# Patient Record
Sex: FEMALE | Race: WHITE | NOT HISPANIC OR LATINO
[De-identification: names, ages, dates, MRNs, and addresses within clinical notes are randomized per-mention and may not be internally consistent; named-entity substitution may affect disease eponyms.]

---

## 2017-05-19 ENCOUNTER — APPOINTMENT (OUTPATIENT)
Dept: NEUROLOGY | Facility: CLINIC | Age: 82
End: 2017-05-19

## 2017-06-08 ENCOUNTER — APPOINTMENT (OUTPATIENT)
Dept: INTERNAL MEDICINE | Facility: CLINIC | Age: 82
End: 2017-06-08

## 2017-06-15 ENCOUNTER — OTHER (OUTPATIENT)
Age: 82
End: 2017-06-15

## 2017-06-15 LAB
25(OH)D3 SERPL-MCNC: 37.4
CHOLEST SERPL-MCNC: 142
CHOLEST/HDLC SERPL: 2.1
GLUCOSE SERPL-MCNC: 97
HBA1C MFR BLD HPLC: 5.8
HDLC SERPL-MCNC: 69
LDLC SERPL CALC-MCNC: 55
TRIGL SERPL-MCNC: 91
VLDL CHOLESTEROL CAL: 18

## 2017-06-19 ENCOUNTER — NON-APPOINTMENT (OUTPATIENT)
Age: 82
End: 2017-06-19

## 2017-06-19 ENCOUNTER — APPOINTMENT (OUTPATIENT)
Dept: INTERNAL MEDICINE | Facility: CLINIC | Age: 82
End: 2017-06-19

## 2017-06-19 VITALS
DIASTOLIC BLOOD PRESSURE: 84 MMHG | SYSTOLIC BLOOD PRESSURE: 132 MMHG | WEIGHT: 167 LBS | TEMPERATURE: 98.1 F | RESPIRATION RATE: 16 BRPM | HEIGHT: 65 IN | OXYGEN SATURATION: 96 % | HEART RATE: 92 BPM | BODY MASS INDEX: 27.82 KG/M2

## 2017-06-19 DIAGNOSIS — M54.30 SCIATICA, UNSPECIFIED SIDE: ICD-10-CM

## 2017-06-20 ENCOUNTER — RX RENEWAL (OUTPATIENT)
Age: 82
End: 2017-06-20

## 2017-06-27 ENCOUNTER — APPOINTMENT (OUTPATIENT)
Dept: DERMATOLOGY | Facility: CLINIC | Age: 82
End: 2017-06-27

## 2017-08-29 ENCOUNTER — APPOINTMENT (OUTPATIENT)
Dept: DERMATOLOGY | Facility: CLINIC | Age: 82
End: 2017-08-29
Payer: MEDICARE

## 2017-08-29 DIAGNOSIS — Z86.69 PERSONAL HISTORY OF OTHER DISEASES OF THE NERVOUS SYSTEM AND SENSE ORGANS: ICD-10-CM

## 2017-08-29 PROCEDURE — 99203 OFFICE O/P NEW LOW 30 MIN: CPT | Mod: 25

## 2017-08-29 PROCEDURE — 17000 DESTRUCT PREMALG LESION: CPT

## 2017-08-29 PROCEDURE — 17003 DESTRUCT PREMALG LES 2-14: CPT

## 2017-09-28 ENCOUNTER — APPOINTMENT (OUTPATIENT)
Dept: INTERNAL MEDICINE | Facility: CLINIC | Age: 82
End: 2017-09-28
Payer: MEDICARE

## 2017-09-28 VITALS
DIASTOLIC BLOOD PRESSURE: 70 MMHG | OXYGEN SATURATION: 93 % | WEIGHT: 159 LBS | TEMPERATURE: 99.9 F | RESPIRATION RATE: 18 BRPM | HEIGHT: 65 IN | HEART RATE: 112 BPM | SYSTOLIC BLOOD PRESSURE: 124 MMHG | BODY MASS INDEX: 26.49 KG/M2

## 2017-09-28 PROCEDURE — 99213 OFFICE O/P EST LOW 20 MIN: CPT

## 2017-09-28 RX ORDER — OXYCODONE 5 MG/1
5 TABLET ORAL
Qty: 40 | Refills: 0 | Status: DISCONTINUED | COMMUNITY
Start: 2017-06-19 | End: 2017-09-28

## 2017-10-02 ENCOUNTER — CLINICAL ADVICE (OUTPATIENT)
Age: 82
End: 2017-10-02

## 2017-10-02 RX ORDER — CEFUROXIME AXETIL 500 MG/1
500 TABLET ORAL
Qty: 20 | Refills: 0 | Status: COMPLETED | COMMUNITY
Start: 2017-09-28 | End: 2017-10-02

## 2017-10-16 ENCOUNTER — APPOINTMENT (OUTPATIENT)
Dept: INTERNAL MEDICINE | Facility: CLINIC | Age: 82
End: 2017-10-16
Payer: MEDICARE

## 2017-10-16 VITALS
OXYGEN SATURATION: 95 % | SYSTOLIC BLOOD PRESSURE: 120 MMHG | DIASTOLIC BLOOD PRESSURE: 80 MMHG | HEART RATE: 90 BPM | BODY MASS INDEX: 27.82 KG/M2 | HEIGHT: 65 IN | WEIGHT: 167 LBS | RESPIRATION RATE: 16 BRPM | TEMPERATURE: 98.1 F

## 2017-10-16 PROCEDURE — 99214 OFFICE O/P EST MOD 30 MIN: CPT

## 2017-12-19 ENCOUNTER — APPOINTMENT (OUTPATIENT)
Dept: DERMATOLOGY | Facility: CLINIC | Age: 82
End: 2017-12-19

## 2017-12-22 ENCOUNTER — OTHER (OUTPATIENT)
Age: 82
End: 2017-12-22

## 2018-01-05 ENCOUNTER — RX RENEWAL (OUTPATIENT)
Age: 83
End: 2018-01-05

## 2018-01-09 ENCOUNTER — APPOINTMENT (OUTPATIENT)
Dept: DERMATOLOGY | Facility: CLINIC | Age: 83
End: 2018-01-09

## 2018-01-11 ENCOUNTER — APPOINTMENT (OUTPATIENT)
Dept: INTERNAL MEDICINE | Facility: CLINIC | Age: 83
End: 2018-01-11
Payer: MEDICARE

## 2018-01-11 VITALS
OXYGEN SATURATION: 96 % | WEIGHT: 167 LBS | TEMPERATURE: 98.1 F | RESPIRATION RATE: 18 BRPM | HEART RATE: 80 BPM | DIASTOLIC BLOOD PRESSURE: 60 MMHG | SYSTOLIC BLOOD PRESSURE: 114 MMHG | BODY MASS INDEX: 27.82 KG/M2 | HEIGHT: 65 IN

## 2018-01-11 DIAGNOSIS — M54.5 LOW BACK PAIN: ICD-10-CM

## 2018-01-11 DIAGNOSIS — G89.29 LOW BACK PAIN: ICD-10-CM

## 2018-01-11 PROCEDURE — 99215 OFFICE O/P EST HI 40 MIN: CPT

## 2018-01-22 ENCOUNTER — APPOINTMENT (OUTPATIENT)
Dept: DERMATOLOGY | Facility: CLINIC | Age: 83
End: 2018-01-22
Payer: MEDICARE

## 2018-01-22 DIAGNOSIS — L81.4 OTHER MELANIN HYPERPIGMENTATION: ICD-10-CM

## 2018-01-22 PROCEDURE — 17003 DESTRUCT PREMALG LES 2-14: CPT

## 2018-01-22 PROCEDURE — 17000 DESTRUCT PREMALG LESION: CPT

## 2018-01-22 PROCEDURE — 99213 OFFICE O/P EST LOW 20 MIN: CPT | Mod: 25

## 2018-02-20 ENCOUNTER — RX RENEWAL (OUTPATIENT)
Age: 83
End: 2018-02-20

## 2018-02-23 ENCOUNTER — RX RENEWAL (OUTPATIENT)
Age: 83
End: 2018-02-23

## 2018-02-26 ENCOUNTER — RX RENEWAL (OUTPATIENT)
Age: 83
End: 2018-02-26

## 2018-04-05 ENCOUNTER — RX RENEWAL (OUTPATIENT)
Age: 83
End: 2018-04-05

## 2018-06-18 ENCOUNTER — APPOINTMENT (OUTPATIENT)
Dept: DERMATOLOGY | Facility: CLINIC | Age: 83
End: 2018-06-18
Payer: MEDICARE

## 2018-06-18 DIAGNOSIS — L57.0 ACTINIC KERATOSIS: ICD-10-CM

## 2018-06-18 DIAGNOSIS — L82.0 INFLAMED SEBORRHEIC KERATOSIS: ICD-10-CM

## 2018-06-18 PROCEDURE — 17110 DESTRUCTION B9 LES UP TO 14: CPT

## 2018-06-18 PROCEDURE — 99213 OFFICE O/P EST LOW 20 MIN: CPT | Mod: 25

## 2018-06-18 RX ORDER — OXYCODONE AND ACETAMINOPHEN 5; 325 MG/1; MG/1
5-325 TABLET ORAL
Qty: 60 | Refills: 0 | Status: DISCONTINUED | COMMUNITY
Start: 2018-01-11 | End: 2018-06-18

## 2018-06-26 ENCOUNTER — APPOINTMENT (OUTPATIENT)
Dept: INTERNAL MEDICINE | Facility: CLINIC | Age: 83
End: 2018-06-26
Payer: MEDICARE

## 2018-06-26 ENCOUNTER — NON-APPOINTMENT (OUTPATIENT)
Age: 83
End: 2018-06-26

## 2018-06-26 VITALS
TEMPERATURE: 97.7 F | DIASTOLIC BLOOD PRESSURE: 76 MMHG | SYSTOLIC BLOOD PRESSURE: 130 MMHG | HEART RATE: 78 BPM | RESPIRATION RATE: 16 BRPM | OXYGEN SATURATION: 94 % | BODY MASS INDEX: 28.99 KG/M2 | WEIGHT: 174 LBS | HEIGHT: 65 IN
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
## 2018-06-26 PROCEDURE — 94060 EVALUATION OF WHEEZING: CPT

## 2018-06-26 PROCEDURE — 99214 OFFICE O/P EST MOD 30 MIN: CPT | Mod: 25

## 2018-06-26 NOTE — HISTORY OF PRESENT ILLNESS
[FreeTextEntry1] : Followup eval [de-identified] : Mrs. Maynard presents for followup evaluation. She denies any chest pain, shortness of breath palpitations. She has some shortness of breath with exertion. She continues on Breo 200/25 mcg one puff daily. She has no hematuria dysuria. Mrs. Maynard is feeling somewhat depressed. She states that she is feeling guilty about that her relationship with her  who passed away last year. She states she is having some feelings of guilt over his treatment.

## 2018-06-26 NOTE — DATA REVIEWED
[FreeTextEntry1] : Spirometry pre-and postbronchodilator therapy shows moderate obstructive lung disease. FEV1 is 1.03 L which is 57% predicted. FEV1/FVC ratio 68%. There is no significant bronchodilator response.

## 2018-06-26 NOTE — ASSESSMENT
[FreeTextEntry1] : Mr. Maynard will continue with her medication regimen. Previous lab work has been reviewed. I recommended that she consider seeing a therapist for her current feelings of chills and depression. She does not wish to start on any new medication. She will continue on duloxetine. Followup in 6 months.

## 2018-08-06 ENCOUNTER — RX RENEWAL (OUTPATIENT)
Age: 83
End: 2018-08-06

## 2018-08-07 ENCOUNTER — RX RENEWAL (OUTPATIENT)
Age: 83
End: 2018-08-07

## 2018-08-07 ENCOUNTER — RX CHANGE (OUTPATIENT)
Age: 83
End: 2018-08-07

## 2018-12-03 ENCOUNTER — MEDICATION RENEWAL (OUTPATIENT)
Age: 83
End: 2018-12-03

## 2018-12-20 ENCOUNTER — APPOINTMENT (OUTPATIENT)
Dept: INTERNAL MEDICINE | Facility: CLINIC | Age: 83
End: 2018-12-20
Payer: MEDICARE

## 2018-12-20 VITALS
OXYGEN SATURATION: 96 % | HEIGHT: 65 IN | SYSTOLIC BLOOD PRESSURE: 120 MMHG | WEIGHT: 170 LBS | BODY MASS INDEX: 28.32 KG/M2 | TEMPERATURE: 98.4 F | HEART RATE: 74 BPM | DIASTOLIC BLOOD PRESSURE: 70 MMHG | RESPIRATION RATE: 16 BRPM

## 2018-12-20 DIAGNOSIS — G62.9 POLYNEUROPATHY, UNSPECIFIED: ICD-10-CM

## 2018-12-20 DIAGNOSIS — M16.10 UNILATERAL PRIMARY OSTEOARTHRITIS, UNSPECIFIED HIP: ICD-10-CM

## 2018-12-20 PROCEDURE — 99214 OFFICE O/P EST MOD 30 MIN: CPT

## 2018-12-20 RX ORDER — VALSARTAN AND HYDROCHLOROTHIAZIDE 160; 12.5 MG/1; MG/1
160-12.5 TABLET, FILM COATED ORAL
Qty: 90 | Refills: 1 | Status: DISCONTINUED | COMMUNITY
Start: 2017-01-25 | End: 2018-12-20

## 2018-12-20 NOTE — PLAN
[FreeTextEntry1] : Mrs. Maynard presents for followup evaluation. She will continue on current medication regimen. Valsartan hydrochlorothiazide was discontinued and the patient is currently on losartan hydrochlorothiazide. Wellbutrin  mg daily will be added to the patient's Cymbalta. Ms. Maynard has been given a prescription for blood profile prior to her next visit in 4 months. She has received the flu vaccine.

## 2018-12-20 NOTE — HISTORY OF PRESENT ILLNESS
[de-identified] : Ms. Maynard presents for followup evaluation. She continues to have significant joint pain. Patient denies any chest pain or palpitations. Ms. Maynard states she is feeling somewhat depressed. She has no suicidal ideation. She is taking Cymbalta, however, she is taking it for treatment of her neuropathy.

## 2019-01-11 ENCOUNTER — APPOINTMENT (OUTPATIENT)
Dept: DERMATOLOGY | Facility: CLINIC | Age: 84
End: 2019-01-11

## 2019-01-14 ENCOUNTER — RX RENEWAL (OUTPATIENT)
Age: 84
End: 2019-01-14

## 2019-02-14 ENCOUNTER — RX RENEWAL (OUTPATIENT)
Age: 84
End: 2019-02-14

## 2019-02-19 ENCOUNTER — MEDICATION RENEWAL (OUTPATIENT)
Age: 84
End: 2019-02-19

## 2019-02-21 ENCOUNTER — RX RENEWAL (OUTPATIENT)
Age: 84
End: 2019-02-21

## 2019-03-25 ENCOUNTER — APPOINTMENT (OUTPATIENT)
Dept: INTERNAL MEDICINE | Facility: CLINIC | Age: 84
End: 2019-03-25
Payer: MEDICARE

## 2019-03-25 ENCOUNTER — OTHER (OUTPATIENT)
Age: 84
End: 2019-03-25

## 2019-03-25 ENCOUNTER — NON-APPOINTMENT (OUTPATIENT)
Age: 84
End: 2019-03-25

## 2019-03-25 VITALS
TEMPERATURE: 98.1 F | SYSTOLIC BLOOD PRESSURE: 142 MMHG | OXYGEN SATURATION: 94 % | HEART RATE: 82 BPM | WEIGHT: 167 LBS | BODY MASS INDEX: 28.51 KG/M2 | DIASTOLIC BLOOD PRESSURE: 92 MMHG | RESPIRATION RATE: 24 BRPM | HEIGHT: 64 IN

## 2019-03-25 DIAGNOSIS — R06.02 SHORTNESS OF BREATH: ICD-10-CM

## 2019-03-25 PROCEDURE — 94060 EVALUATION OF WHEEZING: CPT

## 2019-03-25 PROCEDURE — 96372 THER/PROPH/DIAG INJ SC/IM: CPT | Mod: 59

## 2019-03-25 PROCEDURE — 99214 OFFICE O/P EST MOD 30 MIN: CPT | Mod: 25

## 2019-03-25 RX ORDER — TRIAMCINOLONE ACETONIDE 40 MG/ML
40 SUSPENSION INTRA-ARTERIAL; INTRAMUSCULAR
Qty: 6 | Refills: 0 | Status: COMPLETED | OUTPATIENT
Start: 2019-03-25

## 2019-03-25 RX ORDER — BUPROPION HYDROCHLORIDE 150 MG/1
150 TABLET, EXTENDED RELEASE ORAL DAILY
Qty: 30 | Refills: 5 | Status: DISCONTINUED | COMMUNITY
Start: 2018-12-20 | End: 2019-03-25

## 2019-03-25 RX ADMIN — TRIAMCINOLONE ACETONIDE 0 MG/ML: 40 INJECTION, SUSPENSION INTRA-ARTICULAR; INTRAMUSCULAR at 00:00

## 2019-03-25 NOTE — HISTORY OF PRESENT ILLNESS
[Family Member] : family member [FreeTextEntry1] : Follow up from Urgent Care visit. [de-identified] : Patient presented to Urgent Care yesterday for complaint of SOB, wheeze and cough.  Cough is frequent, "her sides hurt."  Now taking Prednisone 20 MG BID and Doxycycline 100 MG BID.  Started these medications last night.  X ray yesterday negative for infiltrates.\par Cough and SOB today.\zeinab Was treated in [place] for similar complaints, given Z-pack and Medrol dose pack, never completely resolved but was too busy arranging to come back home for the summer to follow up there.\par Patient reports that in general for 1 year she is increasingly short of breath on exertion, walking from car to building, any stair flights.  Tries to remain active but this is difficult because of gait instability and lower extremity neuropathy.  She reports compliance with Breo daily.\zeinab Follows closely with Cardiologist, has history of MI and multiple stent placement.  No history of CHF or Atrial fibrillation.

## 2019-03-25 NOTE — REVIEW OF SYSTEMS
[Fatigue] : fatigue [Shortness Of Breath] : shortness of breath [Wheezing] : wheezing [Cough] : cough [Dyspnea on Exertion] : dyspnea on exertion [Unsteady Walking] : ataxia [Negative] : Heme/Lymph [Fever] : no fever [Chills] : no chills [Night Sweats] : no night sweats [Dizziness] : no dizziness [Fainting] : no fainting [Confusion] : no confusion [FreeTextEntry9] : see HPI

## 2019-03-25 NOTE — DATA REVIEWED
[FreeTextEntry1] : Spirometry shows severe airway obstruction with no improvement post bronchodilator.

## 2019-03-25 NOTE — PHYSICAL EXAM
[No Acute Distress] : no acute distress [Normal Sclera/Conjunctiva] : normal sclera/conjunctiva [PERRL] : pupils equal round and reactive to light [Normal TMs] : both tympanic membranes were normal [Supple] : supple [Normal Rate] : normal rate  [Regular Rhythm] : with a regular rhythm [Normal Supraclavicular Nodes] : no supraclavicular lymphadenopathy [Normal Posterior Cervical Nodes] : no posterior cervical lymphadenopathy [Normal Anterior Cervical Nodes] : no anterior cervical lymphadenopathy [Grossly Normal Strength/Tone] : grossly normal strength/tone [No Focal Deficits] : no focal deficits [Normal Affect] : the affect was normal [Normal Insight/Judgement] : insight and judgment were intact [de-identified] : Oropharynx mildly erythematous without exudates [de-identified] : Breath sounds distant.  End expiratory wheeze throughout

## 2019-03-25 NOTE — PLAN
[FreeTextEntry1] : Continue current medications as prescribed.\par Kenelog 60 MG IM today.\par Return to office in 2 weeks to reassess, pre post.\par Will consider changing her Breo to Budesonide and Brovana through nebulizer delivery for better control.\par Consider CT of chest.\par Call office sooner if no steady improvement in current symptoms.

## 2019-04-09 ENCOUNTER — RESULT REVIEW (OUTPATIENT)
Age: 84
End: 2019-04-09

## 2019-04-09 ENCOUNTER — APPOINTMENT (OUTPATIENT)
Dept: INTERNAL MEDICINE | Facility: CLINIC | Age: 84
End: 2019-04-09
Payer: MEDICARE

## 2019-04-09 ENCOUNTER — NON-APPOINTMENT (OUTPATIENT)
Age: 84
End: 2019-04-09

## 2019-04-09 VITALS
RESPIRATION RATE: 18 BRPM | HEIGHT: 64 IN | OXYGEN SATURATION: 96 % | DIASTOLIC BLOOD PRESSURE: 76 MMHG | SYSTOLIC BLOOD PRESSURE: 108 MMHG | HEART RATE: 74 BPM | BODY MASS INDEX: 28.17 KG/M2 | WEIGHT: 164.99 LBS | TEMPERATURE: 97.9 F

## 2019-04-09 PROCEDURE — 94060 EVALUATION OF WHEEZING: CPT

## 2019-04-09 PROCEDURE — 99214 OFFICE O/P EST MOD 30 MIN: CPT | Mod: 25

## 2019-04-09 NOTE — REVIEW OF SYSTEMS
[Dyspnea on Exertion] : dyspnea on exertion [Joint Pain] : joint pain [Muscle Pain] : muscle pain [Back Pain] : back pain [Negative] : Heme/Lymph [Discharge] : no discharge [Pain] : no pain [Vision Problems] : no vision problems [Itching] : no itching [Wheezing] : no wheezing [Shortness Of Breath] : no shortness of breath [Headache] : no headache [Memory Loss] : no memory loss [Cough] : no cough [Unsteady Walking] : no ataxia [de-identified] : gait instability secondary to neuropathy.

## 2019-04-09 NOTE — PHYSICAL EXAM
[No Acute Distress] : no acute distress [Normal Oropharynx] : the oropharynx was normal [Normal Sclera/Conjunctiva] : normal sclera/conjunctiva [Clear to Auscultation] : lungs were clear to auscultation bilaterally [Supple] : supple [No Respiratory Distress] : no respiratory distress  [Regular Rhythm] : with a regular rhythm [Normal Rate] : normal rate  [Normal Posterior Cervical Nodes] : no posterior cervical lymphadenopathy [No Edema] : there was no peripheral edema [Grossly Normal Strength/Tone] : grossly normal strength/tone [No Spinal Tenderness] : no spinal tenderness [Normal Anterior Cervical Nodes] : no anterior cervical lymphadenopathy [No Focal Deficits] : no focal deficits [Normal Affect] : the affect was normal

## 2019-04-09 NOTE — PLAN
[FreeTextEntry1] : Continue all current medications as prescribed.\par Patient is advised to monitor blood pressure at home and keep log of readings.\par If consistently below 110 systolic and below 70 diastolic notify.\par Letter written verifying back pain and gait instability secondary to neuropathy.\par Appointment with Dr. Duncan  6/25/2019.\par Call office sooner for questions or problems.

## 2019-04-09 NOTE — HISTORY OF PRESENT ILLNESS
[FreeTextEntry8] : Patient returns to our office for re evaluation of lung status.  She was seen in our office 2 weeks ago for acute asthmatic bronchitis and completed Doxycycline and prednisone regimen.  Given Kenelog IM at time of visit.  From a pulmonary standpoint feeling well, denies SOB, cough or sputum production.  Compliant with pulmonary medications.\par She voices concern today that her blood pressure in our office is 108/76.  Takes Losartan HCTZ 50-12.5 MG daily.  Denies dizziness, palpitations or chest pains.  Does not take her blood pressure at home but does have the equipment to do so.  B/P last visit was 142/92 and prior to that in good range.\zeinab Has chronic back pain and bilateral lower extremity neuropathy causing gait instability.  Ambulates with a cane and has a stair lift at home. She is asking for a letter stating her inability to climb stairs to submit to the company that is going to install a stair lift in [place].\par

## 2019-04-29 ENCOUNTER — MEDICATION RENEWAL (OUTPATIENT)
Age: 84
End: 2019-04-29

## 2019-04-29 ENCOUNTER — APPOINTMENT (OUTPATIENT)
Dept: INTERNAL MEDICINE | Facility: CLINIC | Age: 84
End: 2019-04-29

## 2019-04-29 ENCOUNTER — RX RENEWAL (OUTPATIENT)
Age: 84
End: 2019-04-29

## 2019-05-06 ENCOUNTER — INPATIENT (INPATIENT)
Facility: HOSPITAL | Age: 84
LOS: 2 days | Discharge: HOME | End: 2019-05-09
Attending: FAMILY MEDICINE | Admitting: FAMILY MEDICINE
Payer: MEDICARE

## 2019-05-06 VITALS
HEIGHT: 65 IN | TEMPERATURE: 100 F | RESPIRATION RATE: 20 BRPM | HEART RATE: 121 BPM | SYSTOLIC BLOOD PRESSURE: 176 MMHG | OXYGEN SATURATION: 91 % | DIASTOLIC BLOOD PRESSURE: 113 MMHG | WEIGHT: 164.91 LBS

## 2019-05-06 LAB
ADD ON TEST-SPECIMEN IN LAB: SIGNIFICANT CHANGE UP
ALBUMIN SERPL ELPH-MCNC: 3.6 G/DL — SIGNIFICANT CHANGE UP (ref 3.3–5)
ALP SERPL-CCNC: 78 U/L — SIGNIFICANT CHANGE UP (ref 40–120)
ALT FLD-CCNC: 34 U/L — SIGNIFICANT CHANGE UP (ref 12–78)
ANION GAP SERPL CALC-SCNC: 9 MMOL/L — SIGNIFICANT CHANGE UP (ref 5–17)
APPEARANCE UR: CLEAR — SIGNIFICANT CHANGE UP
APTT BLD: 33.6 SEC — SIGNIFICANT CHANGE UP (ref 27.5–36.3)
AST SERPL-CCNC: 31 U/L — SIGNIFICANT CHANGE UP (ref 15–37)
BASOPHILS # BLD AUTO: 0.05 K/UL — SIGNIFICANT CHANGE UP (ref 0–0.2)
BASOPHILS NFR BLD AUTO: 0.3 % — SIGNIFICANT CHANGE UP (ref 0–2)
BILIRUB SERPL-MCNC: 0.7 MG/DL — SIGNIFICANT CHANGE UP (ref 0.2–1.2)
BILIRUB UR-MCNC: NEGATIVE — SIGNIFICANT CHANGE UP
BUN SERPL-MCNC: 9 MG/DL — SIGNIFICANT CHANGE UP (ref 7–23)
CALCIUM SERPL-MCNC: 9 MG/DL — SIGNIFICANT CHANGE UP (ref 8.5–10.1)
CHLORIDE SERPL-SCNC: 92 MMOL/L — LOW (ref 96–108)
CHLORIDE UR-SCNC: 89 MMOL/L — SIGNIFICANT CHANGE UP
CO2 SERPL-SCNC: 28 MMOL/L — SIGNIFICANT CHANGE UP (ref 22–31)
COLOR SPEC: YELLOW — SIGNIFICANT CHANGE UP
CREAT SERPL-MCNC: 0.75 MG/DL — SIGNIFICANT CHANGE UP (ref 0.5–1.3)
DIFF PNL FLD: ABNORMAL
EOSINOPHIL # BLD AUTO: 0.01 K/UL — SIGNIFICANT CHANGE UP (ref 0–0.5)
EOSINOPHIL NFR BLD AUTO: 0.1 % — SIGNIFICANT CHANGE UP (ref 0–6)
EPI CELLS # UR: SIGNIFICANT CHANGE UP
GLUCOSE SERPL-MCNC: 115 MG/DL — HIGH (ref 70–99)
GLUCOSE UR QL: NEGATIVE MG/DL — SIGNIFICANT CHANGE UP
HCT VFR BLD CALC: 46 % — HIGH (ref 34.5–45)
HGB BLD-MCNC: 15.6 G/DL — HIGH (ref 11.5–15.5)
HPIV3 RNA SPEC QL NAA+PROBE: DETECTED
IMM GRANULOCYTES NFR BLD AUTO: 0.5 % — SIGNIFICANT CHANGE UP (ref 0–1.5)
INR BLD: 1.09 RATIO — SIGNIFICANT CHANGE UP (ref 0.88–1.16)
KETONES UR-MCNC: ABNORMAL
LACTATE SERPL-SCNC: 1 MMOL/L — SIGNIFICANT CHANGE UP (ref 0.7–2)
LEUKOCYTE ESTERASE UR-ACNC: NEGATIVE — SIGNIFICANT CHANGE UP
LYMPHOCYTES # BLD AUTO: 0.71 K/UL — LOW (ref 1–3.3)
LYMPHOCYTES # BLD AUTO: 4.8 % — LOW (ref 13–44)
MCHC RBC-ENTMCNC: 30.5 PG — SIGNIFICANT CHANGE UP (ref 27–34)
MCHC RBC-ENTMCNC: 33.9 GM/DL — SIGNIFICANT CHANGE UP (ref 32–36)
MCV RBC AUTO: 89.8 FL — SIGNIFICANT CHANGE UP (ref 80–100)
MONOCYTES # BLD AUTO: 0.89 K/UL — SIGNIFICANT CHANGE UP (ref 0–0.9)
MONOCYTES NFR BLD AUTO: 6 % — SIGNIFICANT CHANGE UP (ref 2–14)
NEUTROPHILS # BLD AUTO: 13.19 K/UL — HIGH (ref 1.8–7.4)
NEUTROPHILS NFR BLD AUTO: 88.3 % — HIGH (ref 43–77)
NITRITE UR-MCNC: NEGATIVE — SIGNIFICANT CHANGE UP
NRBC # BLD: 0 /100 WBCS — SIGNIFICANT CHANGE UP (ref 0–0)
NT-PROBNP SERPL-SCNC: 980 PG/ML — HIGH (ref 0–450)
PH UR: 8 — SIGNIFICANT CHANGE UP (ref 5–8)
PLATELET # BLD AUTO: 202 K/UL — SIGNIFICANT CHANGE UP (ref 150–400)
POTASSIUM SERPL-MCNC: 3.6 MMOL/L — SIGNIFICANT CHANGE UP (ref 3.5–5.3)
POTASSIUM SERPL-SCNC: 3.6 MMOL/L — SIGNIFICANT CHANGE UP (ref 3.5–5.3)
PROT SERPL-MCNC: 7.9 GM/DL — SIGNIFICANT CHANGE UP (ref 6–8.3)
PROT UR-MCNC: 30 MG/DL
PROTHROM AB SERPL-ACNC: 12.1 SEC — SIGNIFICANT CHANGE UP (ref 10–12.9)
RAPID RVP RESULT: DETECTED
RBC # BLD: 5.12 M/UL — SIGNIFICANT CHANGE UP (ref 3.8–5.2)
RBC # FLD: 13.3 % — SIGNIFICANT CHANGE UP (ref 10.3–14.5)
RBC CASTS # UR COMP ASSIST: ABNORMAL /HPF (ref 0–4)
RV+EV RNA SPEC QL NAA+PROBE: DETECTED
SODIUM SERPL-SCNC: 129 MMOL/L — LOW (ref 135–145)
SODIUM SERPL-SCNC: 130 MMOL/L — LOW (ref 135–145)
SODIUM UR-SCNC: 109 MMOL/L — SIGNIFICANT CHANGE UP
SP GR SPEC: 1.01 — SIGNIFICANT CHANGE UP (ref 1.01–1.02)
TROPONIN I SERPL-MCNC: 0.03 NG/ML — SIGNIFICANT CHANGE UP (ref 0.01–0.04)
UROBILINOGEN FLD QL: NEGATIVE MG/DL — SIGNIFICANT CHANGE UP
WBC # BLD: 14.93 K/UL — HIGH (ref 3.8–10.5)
WBC # FLD AUTO: 14.93 K/UL — HIGH (ref 3.8–10.5)
WBC UR QL: NEGATIVE — SIGNIFICANT CHANGE UP

## 2019-05-06 PROCEDURE — 71045 X-RAY EXAM CHEST 1 VIEW: CPT | Mod: 26

## 2019-05-06 PROCEDURE — 93010 ELECTROCARDIOGRAM REPORT: CPT

## 2019-05-06 PROCEDURE — 99285 EMERGENCY DEPT VISIT HI MDM: CPT

## 2019-05-06 RX ORDER — CLOPIDOGREL BISULFATE 75 MG/1
75 TABLET, FILM COATED ORAL DAILY
Qty: 0 | Refills: 0 | Status: DISCONTINUED | OUTPATIENT
Start: 2019-05-06 | End: 2019-05-09

## 2019-05-06 RX ORDER — LOSARTAN POTASSIUM 100 MG/1
50 TABLET, FILM COATED ORAL DAILY
Qty: 0 | Refills: 0 | Status: DISCONTINUED | OUTPATIENT
Start: 2019-05-06 | End: 2019-05-09

## 2019-05-06 RX ORDER — GABAPENTIN 400 MG/1
1 CAPSULE ORAL
Qty: 0 | Refills: 0 | COMMUNITY

## 2019-05-06 RX ORDER — ASPIRIN/CALCIUM CARB/MAGNESIUM 324 MG
81 TABLET ORAL DAILY
Qty: 0 | Refills: 0 | Status: DISCONTINUED | OUTPATIENT
Start: 2019-05-06 | End: 2019-05-09

## 2019-05-06 RX ORDER — ISOSORBIDE MONONITRATE 60 MG/1
30 TABLET, EXTENDED RELEASE ORAL DAILY
Qty: 0 | Refills: 0 | Status: DISCONTINUED | OUTPATIENT
Start: 2019-05-06 | End: 2019-05-09

## 2019-05-06 RX ORDER — SODIUM CHLORIDE 9 MG/ML
1000 INJECTION INTRAMUSCULAR; INTRAVENOUS; SUBCUTANEOUS
Qty: 0 | Refills: 0 | Status: DISCONTINUED | OUTPATIENT
Start: 2019-05-06 | End: 2019-05-08

## 2019-05-06 RX ORDER — HEPARIN SODIUM 5000 [USP'U]/ML
5000 INJECTION INTRAVENOUS; SUBCUTANEOUS EVERY 12 HOURS
Qty: 0 | Refills: 0 | Status: DISCONTINUED | OUTPATIENT
Start: 2019-05-06 | End: 2019-05-09

## 2019-05-06 RX ORDER — SODIUM CHLORIDE 9 MG/ML
2300 INJECTION, SOLUTION INTRAVENOUS ONCE
Qty: 0 | Refills: 0 | Status: COMPLETED | OUTPATIENT
Start: 2019-05-06 | End: 2019-05-06

## 2019-05-06 RX ORDER — CEFTRIAXONE 500 MG/1
1000 INJECTION, POWDER, FOR SOLUTION INTRAMUSCULAR; INTRAVENOUS ONCE
Qty: 0 | Refills: 0 | Status: COMPLETED | OUTPATIENT
Start: 2019-05-06 | End: 2019-05-06

## 2019-05-06 RX ORDER — FLUTICASONE FUROATE AND VILANTEROL TRIFENATATE 100; 25 UG/1; UG/1
1 POWDER RESPIRATORY (INHALATION)
Qty: 0 | Refills: 0 | COMMUNITY

## 2019-05-06 RX ORDER — ACETAMINOPHEN 500 MG
325 TABLET ORAL EVERY 4 HOURS
Qty: 0 | Refills: 0 | Status: DISCONTINUED | OUTPATIENT
Start: 2019-05-06 | End: 2019-05-09

## 2019-05-06 RX ORDER — AZITHROMYCIN 500 MG/1
TABLET, FILM COATED ORAL
Qty: 0 | Refills: 0 | Status: DISCONTINUED | OUTPATIENT
Start: 2019-05-06 | End: 2019-05-08

## 2019-05-06 RX ORDER — ACETAMINOPHEN 500 MG
650 TABLET ORAL ONCE
Qty: 0 | Refills: 0 | Status: COMPLETED | OUTPATIENT
Start: 2019-05-06 | End: 2019-05-06

## 2019-05-06 RX ORDER — LOSARTAN/HYDROCHLOROTHIAZIDE 100MG-25MG
1 TABLET ORAL
Qty: 0 | Refills: 0 | COMMUNITY

## 2019-05-06 RX ORDER — CEFTRIAXONE 500 MG/1
1 INJECTION, POWDER, FOR SOLUTION INTRAMUSCULAR; INTRAVENOUS ONCE
Qty: 0 | Refills: 0 | Status: DISCONTINUED | OUTPATIENT
Start: 2019-05-06 | End: 2019-05-06

## 2019-05-06 RX ORDER — GABAPENTIN 400 MG/1
600 CAPSULE ORAL AT BEDTIME
Qty: 0 | Refills: 0 | Status: DISCONTINUED | OUTPATIENT
Start: 2019-05-06 | End: 2019-05-09

## 2019-05-06 RX ORDER — CEFTRIAXONE 500 MG/1
1000 INJECTION, POWDER, FOR SOLUTION INTRAMUSCULAR; INTRAVENOUS EVERY 24 HOURS
Qty: 0 | Refills: 0 | Status: DISCONTINUED | OUTPATIENT
Start: 2019-05-07 | End: 2019-05-09

## 2019-05-06 RX ORDER — DULOXETINE HYDROCHLORIDE 30 MG/1
60 CAPSULE, DELAYED RELEASE ORAL DAILY
Refills: 0 | Status: DISCONTINUED | OUTPATIENT
Start: 2019-05-06 | End: 2019-05-09

## 2019-05-06 RX ORDER — ONDANSETRON 8 MG/1
4 TABLET, FILM COATED ORAL EVERY 6 HOURS
Qty: 0 | Refills: 0 | Status: DISCONTINUED | OUTPATIENT
Start: 2019-05-06 | End: 2019-05-09

## 2019-05-06 RX ORDER — LANOLIN ALCOHOL/MO/W.PET/CERES
3 CREAM (GRAM) TOPICAL AT BEDTIME
Qty: 0 | Refills: 0 | Status: DISCONTINUED | OUTPATIENT
Start: 2019-05-06 | End: 2019-05-09

## 2019-05-06 RX ORDER — DOCUSATE SODIUM 100 MG
100 CAPSULE ORAL THREE TIMES A DAY
Qty: 0 | Refills: 0 | Status: DISCONTINUED | OUTPATIENT
Start: 2019-05-06 | End: 2019-05-09

## 2019-05-06 RX ORDER — BUDESONIDE, MICRONIZED 100 %
0.5 POWDER (GRAM) MISCELLANEOUS EVERY 12 HOURS
Qty: 0 | Refills: 0 | Status: DISCONTINUED | OUTPATIENT
Start: 2019-05-06 | End: 2019-05-09

## 2019-05-06 RX ORDER — ISOSORBIDE MONONITRATE 60 MG/1
1 TABLET, EXTENDED RELEASE ORAL
Qty: 0 | Refills: 0 | COMMUNITY

## 2019-05-06 RX ORDER — SENNA PLUS 8.6 MG/1
2 TABLET ORAL AT BEDTIME
Qty: 0 | Refills: 0 | Status: DISCONTINUED | OUTPATIENT
Start: 2019-05-06 | End: 2019-05-09

## 2019-05-06 RX ORDER — AZITHROMYCIN 500 MG/1
500 TABLET, FILM COATED ORAL ONCE
Qty: 0 | Refills: 0 | Status: COMPLETED | OUTPATIENT
Start: 2019-05-06 | End: 2019-05-06

## 2019-05-06 RX ORDER — PANTOPRAZOLE SODIUM 20 MG/1
40 TABLET, DELAYED RELEASE ORAL
Qty: 0 | Refills: 0 | Status: DISCONTINUED | OUTPATIENT
Start: 2019-05-06 | End: 2019-05-09

## 2019-05-06 RX ORDER — CARVEDILOL PHOSPHATE 80 MG/1
6.25 CAPSULE, EXTENDED RELEASE ORAL EVERY 12 HOURS
Qty: 0 | Refills: 0 | Status: DISCONTINUED | OUTPATIENT
Start: 2019-05-06 | End: 2019-05-09

## 2019-05-06 RX ORDER — AZITHROMYCIN 500 MG/1
500 TABLET, FILM COATED ORAL EVERY 24 HOURS
Qty: 0 | Refills: 0 | Status: DISCONTINUED | OUTPATIENT
Start: 2019-05-07 | End: 2019-05-08

## 2019-05-06 RX ORDER — IPRATROPIUM/ALBUTEROL SULFATE 18-103MCG
3 AEROSOL WITH ADAPTER (GRAM) INHALATION EVERY 6 HOURS
Qty: 0 | Refills: 0 | Status: DISCONTINUED | OUTPATIENT
Start: 2019-05-06 | End: 2019-05-09

## 2019-05-06 RX ORDER — SIMVASTATIN 20 MG/1
20 TABLET, FILM COATED ORAL AT BEDTIME
Qty: 0 | Refills: 0 | Status: DISCONTINUED | OUTPATIENT
Start: 2019-05-06 | End: 2019-05-09

## 2019-05-06 RX ADMIN — CARVEDILOL PHOSPHATE 6.25 MILLIGRAM(S): 80 CAPSULE, EXTENDED RELEASE ORAL at 18:31

## 2019-05-06 RX ADMIN — SODIUM CHLORIDE 75 MILLILITER(S): 9 INJECTION INTRAMUSCULAR; INTRAVENOUS; SUBCUTANEOUS at 17:18

## 2019-05-06 RX ADMIN — SODIUM CHLORIDE 75 MILLILITER(S): 9 INJECTION INTRAMUSCULAR; INTRAVENOUS; SUBCUTANEOUS at 19:08

## 2019-05-06 RX ADMIN — AZITHROMYCIN 255 MILLIGRAM(S): 500 TABLET, FILM COATED ORAL at 17:15

## 2019-05-06 RX ADMIN — LOSARTAN POTASSIUM 50 MILLIGRAM(S): 100 TABLET, FILM COATED ORAL at 17:16

## 2019-05-06 RX ADMIN — SODIUM CHLORIDE 2300 MILLILITER(S): 9 INJECTION, SOLUTION INTRAVENOUS at 12:30

## 2019-05-06 RX ADMIN — Medication 40 MILLIGRAM(S): at 19:08

## 2019-05-06 RX ADMIN — Medication 600 MILLIGRAM(S): at 18:31

## 2019-05-06 RX ADMIN — Medication 1 TABLET(S): at 18:31

## 2019-05-06 RX ADMIN — SODIUM CHLORIDE 2300 MILLILITER(S): 9 INJECTION, SOLUTION INTRAVENOUS at 11:15

## 2019-05-06 RX ADMIN — CEFTRIAXONE 1000 MILLIGRAM(S): 500 INJECTION, POWDER, FOR SOLUTION INTRAMUSCULAR; INTRAVENOUS at 11:47

## 2019-05-06 RX ADMIN — Medication 650 MILLIGRAM(S): at 11:48

## 2019-05-06 RX ADMIN — Medication 125 MILLIGRAM(S): at 17:15

## 2019-05-06 RX ADMIN — ISOSORBIDE MONONITRATE 30 MILLIGRAM(S): 60 TABLET, EXTENDED RELEASE ORAL at 18:31

## 2019-05-06 RX ADMIN — SIMVASTATIN 20 MILLIGRAM(S): 20 TABLET, FILM COATED ORAL at 22:24

## 2019-05-06 RX ADMIN — CLOPIDOGREL BISULFATE 75 MILLIGRAM(S): 75 TABLET, FILM COATED ORAL at 18:31

## 2019-05-06 RX ADMIN — GABAPENTIN 600 MILLIGRAM(S): 400 CAPSULE ORAL at 22:24

## 2019-05-06 RX ADMIN — Medication 3 MILLIGRAM(S): at 23:04

## 2019-05-06 RX ADMIN — HEPARIN SODIUM 5000 UNIT(S): 5000 INJECTION INTRAVENOUS; SUBCUTANEOUS at 18:30

## 2019-05-06 NOTE — H&P ADULT - ASSESSMENT
*Dyspnea and Cough 2ndry to Asthmatic Bronchitis with Superimposed Viral Syndrome  -Admit to medical Floors  -Positive Rhino/Parainfluenza 3  -CXR clear   -IVF  -Solumedrol 125mg IV X 1 then 40mg IV Q12H  -nebs ATC  -Pulmicort  -Rocephin and Zithromax #1  -Pulm consult     *Leukocytosis 2ndry to above   -continue IV abx  -continue to monitor    *Hyponatremia  most likely 2ndry to Diuretics and Hypovolemia  -Na 129  -IVF  -repeat NA levels tonight and gloria  -FU Na studies  -renal consult    *HTN  -continue Losartan, Imdur and Carvedilol  -Hold HCTZ    *CAD S/P Stent X 5 and MI/Hyperlipidemia  -continue Home meds    *DVT ppx  -Heparin SQ    IMPROVE VTE Individual Risk Assessment    RISK                                                                Points    [  ] Previous VTE                                                  3    [  ] Thrombophilia                                               2    [  ] Lower limb paralysis                                      2        (unable to hold up >15 seconds)      [  ] Current Cancer                                              2         (within 6 months)    [ X ] Immobilization > 24 hrs                                1    [  ] ICU/CCU stay > 24 hours                              1    [ X  ] Age > 60                                                      1    IMPROVE VTE Score ___2______    IMPROVE Score 0-1: Low Risk, No VTE prophylaxis required for most patients, encourage ambulation.   IMPROVE Score 2-3: At risk, pharmacologic VTE prophylaxis is indicated for most patients (in the absence of a contraindication)  IMPROVE Score > or = 4: High Risk, pharmacologic VTE prophylaxis is indicated for most patients (in the absence of a contraindication)

## 2019-05-06 NOTE — ED PROVIDER NOTE - PSH
Carpal tunnel syndrome    History of cataract surgery    History of coronary artery stent placement  LAD  History of hysterectomy    History of lumbar laminectomy for spinal cord decompression    History of total knee replacement, bilateral    Malfunction of spinal cord stimulator  removed

## 2019-05-06 NOTE — ED PROVIDER NOTE - OBJECTIVE STATEMENT
87 year old female with PMH of Asthma, CAD with stents, HTN is here with complaints of SOB and Cough for the past week. No sick contacts. Pt has been compliant with her meds. States her cough got worse today and she was very short of breath. No fever, chills, NVD, HA, CP or abdominal pain. Pt does not smoke.

## 2019-05-06 NOTE — ED PROVIDER NOTE - ATTENDING CONTRIBUTION TO CARE
I, Randa Dhillon MD, personally saw the patient with resident.  I have personally performed a face to face diagnostic evaluation on this patient.  I have reviewed the resident note and agree with the history, exam, and plan of care, except as noted.

## 2019-05-06 NOTE — H&P ADULT - HISTORY OF PRESENT ILLNESS
87 year old female with PMH of Asthma, CAD S/P MI and stents X 5, HTN is here with complaints of SOB and Cough for the past week. No sick contacts.  States her productive cough got worse today and she was very short of breath. +wheezing, No fever, chills, NVD, HA, CP or abdominal pain. +decrease appetite.  Felt better in the ER after treatment.       Past Medical History:  CAD (coronary artery disease)    Chronic back pain    Dyslipidemia    Hypertension.    Past Surgical History:  Carpal tunnel syndrome    History of cataract surgery    History of coronary artery stent placement  LAD  History of hysterectomy    History of lumbar laminectomy for spinal cord decompression    History of total knee replacement, bilateral    Malfunction of spinal cord stimulator  removed.    Social History:  Lives at home. No tobacco, alcohol or illicit drug use    Family History:  unsure of age and cause of death of both parents

## 2019-05-06 NOTE — ED PROVIDER NOTE - CROS ED ALLERGIC IMMUN ALL NEG
Detail Level: Simple Instructions (Optional): Discussed with pt to follow up in 3 months negative...

## 2019-05-06 NOTE — H&P ADULT - NSHPPHYSICALEXAM_GEN_ALL_CORE
PHYSICAL EXAM:  Constitutional: NAD, awake and alert, well-developed  Neurological: AAO x 3, no focal deficits  HEENT: PERRLA, EOMI, MMM  Neck: Soft and supple, No LAD, No JVD  Respiratory: Breath sounds are clear bilaterally, + wheezing and Rhonchi  Cardiovascular: S1 and S2, regular rate and rhythm; no Murmurs, gallops or rubs  Gastrointestinal: Bowel Sounds present, soft, nontender, nondistended, no guarding, no rebound tenderness  Back: No CVA tenderness   Extremities: No peripheral edema  Vascular: 2+ peripheral pulses  Musculoskeletal: 5/5 strength b/l upper and lower extremities  Skin: No rashes  Breast: Deferred  Rectal: Deferred

## 2019-05-06 NOTE — H&P ADULT - NSHPREVIEWOFSYSTEMS_GEN_ALL_CORE
REVIEW OF SYSTEMS:    CONSTITUTIONAL: No weakness, fevers or chills  EYES/ENT: No visual changes; vertigo or throat pain   NECK: No pain or stiffness  RESPIRATORY: No cough, +wheezing, hemoptysis +shortness of breath  CARDIOVASCULAR: No chest pain or palpitations  GASTROINTESTINAL: No abdominal or epigastric pain. No nausea, vomiting, or hematemesis; No diarrhea or constipation. No melena or hematochezia.  GENITOURINARY: No dysuria, urinary frequency or hematuria  NEUROLOGICAL: No numbness or weakness  EXTREMITIES: No swelling or tenderness  SKIN: No itching, burning, rashes, or lesions   All other review of systems is negative unless indicated above.

## 2019-05-06 NOTE — PHARMACOTHERAPY INTERVENTION NOTE - COMMENTS
Completed medication history. Home medications reviewed with patient (who is a poor historian) and confirmed with Dr First

## 2019-05-06 NOTE — ED ADULT NURSE NOTE - NSIMPLEMENTINTERV_GEN_ALL_ED
Implemented All Fall with Harm Risk Interventions:  [place] to call system. Call bell, personal items and telephone within reach. Instruct patient to call for assistance. Room bathroom lighting operational. Non-slip footwear when patient is off stretcher. Physically safe environment: no spills, clutter or unnecessary equipment. Stretcher in lowest position, wheels locked, appropriate side rails in place. Provide visual cue, wrist band, yellow gown, etc. Monitor gait and stability. Monitor for mental status changes and reorient to person, place, and time. Review medications for side effects contributing to fall risk. Reinforce activity limits and safety measures with patient and family. Provide visual clues: red socks.

## 2019-05-06 NOTE — H&P ADULT - NSHPLABSRESULTS_GEN_ALL_CORE
Lab Results:  CBC  CBC Full  -  ( 06 May 2019 11:29 )  WBC Count : 14.93 K/uL  RBC Count : 5.12 M/uL  Hemoglobin : 15.6 g/dL  Hematocrit : 46.0 %  Platelet Count - Automated : 202 K/uL  Mean Cell Volume : 89.8 fl  Mean Cell Hemoglobin : 30.5 pg  Mean Cell Hemoglobin Concentration : 33.9 gm/dL  Auto Neutrophil # : 13.19 K/uL  Auto Lymphocyte # : 0.71 K/uL  Auto Monocyte # : 0.89 K/uL  Auto Eosinophil # : 0.01 K/uL  Auto Basophil # : 0.05 K/uL  Auto Neutrophil % : 88.3 %  Auto Lymphocyte % : 4.8 %  Auto Monocyte % : 6.0 %  Auto Eosinophil % : 0.1 %  Auto Basophil % : 0.3 %    .		Differential:	[] Automated		[] Manual  Chemistry                        15.6   14.93 )-----------( 202      ( 06 May 2019 11:29 )             46.0     05-06    129<L>  |  92<L>  |  9   ----------------------------<  115<H>  3.6   |  28  |  0.75    Ca    9.0      06 May 2019 11:29    TPro  7.9  /  Alb  3.6  /  TBili  0.7  /  DBili  x   /  AST  31  /  ALT  34  /  AlkPhos  78  05-06    LIVER FUNCTIONS - ( 06 May 2019 11:29 )  Alb: 3.6 g/dL / Pro: 7.9 gm/dL / ALK PHOS: 78 U/L / ALT: 34 U/L / AST: 31 U/L / GGT: x           PT/INR - ( 06 May 2019 11:29 )   PT: 12.1 sec;   INR: 1.09 ratio         PTT - ( 06 May 2019 11:29 )  PTT:33.6 sec  Urinalysis Basic - ( 06 May 2019 11:29 )    Color: Yellow / Appearance: Clear / S.010 / pH: x  Gluc: x / Ketone: Small  / Bili: Negative / Urobili: Negative mg/dL   Blood: x / Protein: 30 mg/dL / Nitrite: Negative   Leuk Esterase: Negative / RBC: 3-5 /HPF / WBC Negative   Sq Epi: x / Non Sq Epi: Occasional / Bacteria: x      RADIOLOGY RESULTS:    < from: Xray Chest 1 View-PORTABLE IMMEDIATE (19 @ 11:52) >      IMPRESSION: Normal AP chest.        < end of copied text >

## 2019-05-07 DIAGNOSIS — R73.9 HYPERGLYCEMIA, UNSPECIFIED: ICD-10-CM

## 2019-05-07 PROBLEM — H35.30 UNSPECIFIED MACULAR DEGENERATION: Chronic | Status: ACTIVE | Noted: 2019-05-07

## 2019-05-07 LAB
ANION GAP SERPL CALC-SCNC: 9 MMOL/L — SIGNIFICANT CHANGE UP (ref 5–17)
BUN SERPL-MCNC: 13 MG/DL — SIGNIFICANT CHANGE UP (ref 7–23)
CALCIUM SERPL-MCNC: 8.7 MG/DL — SIGNIFICANT CHANGE UP (ref 8.5–10.1)
CHLORIDE SERPL-SCNC: 96 MMOL/L — SIGNIFICANT CHANGE UP (ref 96–108)
CO2 SERPL-SCNC: 26 MMOL/L — SIGNIFICANT CHANGE UP (ref 22–31)
CORTIS AM PEAK SERPL-MCNC: 18.2 UG/DL — SIGNIFICANT CHANGE UP (ref 6–18.4)
CREAT SERPL-MCNC: 0.67 MG/DL — SIGNIFICANT CHANGE UP (ref 0.5–1.3)
CULTURE RESULTS: SIGNIFICANT CHANGE UP
GLUCOSE SERPL-MCNC: 136 MG/DL — HIGH (ref 70–99)
HCT VFR BLD CALC: 42.6 % — SIGNIFICANT CHANGE UP (ref 34.5–45)
HGB BLD-MCNC: 14.4 G/DL — SIGNIFICANT CHANGE UP (ref 11.5–15.5)
MAGNESIUM SERPL-MCNC: 2.1 MG/DL — SIGNIFICANT CHANGE UP (ref 1.6–2.6)
MCHC RBC-ENTMCNC: 30.3 PG — SIGNIFICANT CHANGE UP (ref 27–34)
MCHC RBC-ENTMCNC: 33.8 GM/DL — SIGNIFICANT CHANGE UP (ref 32–36)
MCV RBC AUTO: 89.7 FL — SIGNIFICANT CHANGE UP (ref 80–100)
NRBC # BLD: 0 /100 WBCS — SIGNIFICANT CHANGE UP (ref 0–0)
OSMOLALITY SERPL: 273 MOSM/KG — LOW (ref 289–308)
OSMOLALITY UR: 329 MOSM/KG — SIGNIFICANT CHANGE UP (ref 50–1200)
PHOSPHATE SERPL-MCNC: 4.1 MG/DL — SIGNIFICANT CHANGE UP (ref 2.5–4.5)
PLATELET # BLD AUTO: 165 K/UL — SIGNIFICANT CHANGE UP (ref 150–400)
POTASSIUM SERPL-MCNC: 3.7 MMOL/L — SIGNIFICANT CHANGE UP (ref 3.5–5.3)
POTASSIUM SERPL-SCNC: 3.7 MMOL/L — SIGNIFICANT CHANGE UP (ref 3.5–5.3)
RBC # BLD: 4.75 M/UL — SIGNIFICANT CHANGE UP (ref 3.8–5.2)
RBC # FLD: 13.3 % — SIGNIFICANT CHANGE UP (ref 10.3–14.5)
SODIUM SERPL-SCNC: 131 MMOL/L — LOW (ref 135–145)
SPECIMEN SOURCE: SIGNIFICANT CHANGE UP
URATE SERPL-MCNC: 2.7 MG/DL — SIGNIFICANT CHANGE UP (ref 2.5–7)
WBC # BLD: 10.1 K/UL — SIGNIFICANT CHANGE UP (ref 3.8–10.5)
WBC # FLD AUTO: 10.1 K/UL — SIGNIFICANT CHANGE UP (ref 3.8–10.5)

## 2019-05-07 PROCEDURE — 99222 1ST HOSP IP/OBS MODERATE 55: CPT

## 2019-05-07 RX ORDER — LACTOBACILLUS ACIDOPHILUS 100MM CELL
1 CAPSULE ORAL
Qty: 0 | Refills: 0 | Status: DISCONTINUED | OUTPATIENT
Start: 2019-05-07 | End: 2019-05-09

## 2019-05-07 RX ADMIN — Medication 1 TABLET(S): at 11:59

## 2019-05-07 RX ADMIN — CLOPIDOGREL BISULFATE 75 MILLIGRAM(S): 75 TABLET, FILM COATED ORAL at 11:38

## 2019-05-07 RX ADMIN — Medication 40 MILLIGRAM(S): at 06:31

## 2019-05-07 RX ADMIN — Medication 81 MILLIGRAM(S): at 11:38

## 2019-05-07 RX ADMIN — Medication 325 MILLIGRAM(S): at 08:30

## 2019-05-07 RX ADMIN — Medication 3 MILLIGRAM(S): at 21:07

## 2019-05-07 RX ADMIN — Medication 3 MILLILITER(S): at 20:23

## 2019-05-07 RX ADMIN — Medication 325 MILLIGRAM(S): at 12:45

## 2019-05-07 RX ADMIN — Medication 3 MILLILITER(S): at 14:09

## 2019-05-07 RX ADMIN — GABAPENTIN 600 MILLIGRAM(S): 400 CAPSULE ORAL at 21:07

## 2019-05-07 RX ADMIN — CARVEDILOL PHOSPHATE 6.25 MILLIGRAM(S): 80 CAPSULE, EXTENDED RELEASE ORAL at 06:32

## 2019-05-07 RX ADMIN — ISOSORBIDE MONONITRATE 30 MILLIGRAM(S): 60 TABLET, EXTENDED RELEASE ORAL at 11:42

## 2019-05-07 RX ADMIN — Medication 1 TABLET(S): at 11:40

## 2019-05-07 RX ADMIN — DULOXETINE HYDROCHLORIDE 60 MILLIGRAM(S): 30 CAPSULE, DELAYED RELEASE ORAL at 11:39

## 2019-05-07 RX ADMIN — CARVEDILOL PHOSPHATE 6.25 MILLIGRAM(S): 80 CAPSULE, EXTENDED RELEASE ORAL at 17:18

## 2019-05-07 RX ADMIN — Medication 325 MILLIGRAM(S): at 07:46

## 2019-05-07 RX ADMIN — Medication 325 MILLIGRAM(S): at 12:01

## 2019-05-07 RX ADMIN — SIMVASTATIN 20 MILLIGRAM(S): 20 TABLET, FILM COATED ORAL at 21:07

## 2019-05-07 RX ADMIN — Medication 3 MILLILITER(S): at 07:57

## 2019-05-07 RX ADMIN — Medication 600 MILLIGRAM(S): at 17:18

## 2019-05-07 RX ADMIN — Medication 0.5 MILLIGRAM(S): at 07:57

## 2019-05-07 RX ADMIN — Medication 0.5 MILLIGRAM(S): at 20:23

## 2019-05-07 RX ADMIN — PANTOPRAZOLE SODIUM 40 MILLIGRAM(S): 20 TABLET, DELAYED RELEASE ORAL at 06:32

## 2019-05-07 RX ADMIN — CEFTRIAXONE 1000 MILLIGRAM(S): 500 INJECTION, POWDER, FOR SOLUTION INTRAMUSCULAR; INTRAVENOUS at 11:41

## 2019-05-07 RX ADMIN — HEPARIN SODIUM 5000 UNIT(S): 5000 INJECTION INTRAVENOUS; SUBCUTANEOUS at 06:31

## 2019-05-07 RX ADMIN — SODIUM CHLORIDE 75 MILLILITER(S): 9 INJECTION INTRAMUSCULAR; INTRAVENOUS; SUBCUTANEOUS at 08:44

## 2019-05-07 RX ADMIN — Medication 600 MILLIGRAM(S): at 06:32

## 2019-05-07 RX ADMIN — AZITHROMYCIN 255 MILLIGRAM(S): 500 TABLET, FILM COATED ORAL at 16:03

## 2019-05-07 RX ADMIN — Medication 1 TABLET(S): at 17:18

## 2019-05-07 RX ADMIN — LOSARTAN POTASSIUM 50 MILLIGRAM(S): 100 TABLET, FILM COATED ORAL at 06:32

## 2019-05-07 RX ADMIN — HEPARIN SODIUM 5000 UNIT(S): 5000 INJECTION INTRAVENOUS; SUBCUTANEOUS at 17:18

## 2019-05-07 NOTE — PHYSICAL THERAPY INITIAL EVALUATION ADULT - GENERAL OBSERVATIONS, REHAB EVAL
supine in bed after being transferred 528 to 533 ,+isolation with droplet precautions,+intermittent hacking cough ,awake,alert,Ox3,IVF infusing LUE

## 2019-05-07 NOTE — PROGRESS NOTE ADULT - ASSESSMENT
*Dyspnea and Cough 2ndry to Asthmatic Bronchitis with Superimposed Viral Syndrome  -Positive Rhino/Parainfluenza 3  -CXR clear   -IVF  -Solumedrol 125mg IV X 1 then 40mg IV Q12H  -nebs ATC  -Pulmicort  -Rocephin and Zithromax #2  -Pulm consult     *Leukocytosis 2ndry to above - resolved   -continue IV abx  -continue to monitor    *Hyponatremia  most likely 2ndry to Diuretics and Hypovolemia  -Na 129 ->131  -IVF  -FU Na studies  -renal consult  -Monitor BMP    *HTN  -continue Losartan, Imdur and Carvedilol  -Hold HCTZ    *CAD S/P Stent X 5 and MI/Hyperlipidemia  -continue Home meds    *DVT ppx  -Heparin SQ *Dyspnea and Cough 2ndry to Asthmatic Bronchitis with Superimposed Viral Syndrome  -Positive Rhino/Parainfluenza 3  -CXR clear   -IVF  -taper 40mg IV QD as patient feeling anxious  -nebs ATC  -Pulmicort  -Rocephin and Zithromax #2  -Pulm consult     *Leukocytosis 2ndry to above - resolved   -continue IV abx  -continue to monitor    *Hyponatremia  most likely 2ndry to Diuretics and Hypovolemia  -Na 129 ->131  -IVF  -FU Na studies  -Monitor BMP    *Diarrhea   -continue to monitor  -lactobacillus  -if no improvement need to send stool for GI PCR and Cdiff    *HTN  -continue Losartan, Imdur and Carvedilol  -Hold HCTZ    *CAD S/P Stent X 5 and MI/Hyperlipidemia  -continue Home meds    *DVT ppx  -Heparin SQ

## 2019-05-07 NOTE — PROGRESS NOTE ADULT - SUBJECTIVE AND OBJECTIVE BOX
HOSPITALIST PROGRESS NOTE:  SUBJECTIVE:  PCP:  Chief Complaint: Patient is a 87y old  Female who presents with a chief complaint of Dyspnea, cough (06 May 2019 15:32)      HPI:  87 year old female with PMH of Asthma, CAD S/P MI and stents X 5, HTN is here with complaints of SOB and Cough for the past week. No sick contacts.  States her productive cough got worse today and she was very short of breath. +wheezing, No fever, chills, NVD, HA, CP or abdominal pain. +decrease appetite.  Felt better in the ER after treatment.     : Above Reviewed    Allergies:  No Known Allergies    REVIEW OF SYSTEMS:  See HPI. All other review of systems is negative unless indicated above.     OBJECTIVE  Physical Exam:  Vital Signs:  Height (cm): 165.1 ( @ 11:03)  Weight (kg): 73.6 ( @ 19:04)  BMI (kg/m2): 27 ( @ 19:04)  BSA (m2): 1.81 ( @ 19:04)  Vital Signs Last 24 Hrs  T(C): 36.4 (07 May 2019 06:08), Max: 37.6 (06 May 2019 11:03)  T(F): 97.5 (07 May 2019 06:08), Max: 99.7 (06 May 2019 11:03)  HR: 89 (07 May 2019 06:08) (79 - 121)  BP: 162/84 (07 May 2019 06:08) (111/88 - 185/100)  BP(mean): --  RR: 17 (07 May 2019 06:08) (17 - 26)  SpO2: 100% (07 May 2019 06:08) (91% - 100%)  I&O's Summary    06 May 2019 07:01  -  07 May 2019 07:00  --------------------------------------------------------  IN: 2450 mL / OUT: 0 mL / NET: 2450 mL      PHYSICAL EXAM:  	Constitutional: NAD, awake and alert, well-developed  	Neurological: AAO x 3, no focal deficits  	HEENT: PERRLA, EOMI, MMM  	Neck: Soft and supple, No LAD, No JVD  	Respiratory: Breath sounds are clear bilaterally, + wheezing and Rhonchi  	Cardiovascular: S1 and S2, regular rate and rhythm; no Murmurs, gallops or rubs  	Gastrointestinal: Bowel Sounds present, soft, nontender, nondistended, no guarding, no rebound tenderness  	Back: No CVA tenderness   	Extremities: No peripheral edema  	Vascular: 2+ peripheral pulses  	Musculoskeletal: 5/5 strength b/l upper and lower extremities  	Skin: No rashes  	Breast: Deferred  Rectal: Deferred    MEDICATIONS  (STANDING):  ALBUTerol/ipratropium for Nebulization 3 milliLiter(s) Nebulizer every 6 hours  aspirin enteric coated 81 milliGRAM(s) Oral daily  azithromycin  IVPB 500 milliGRAM(s) IV Intermittent every 24 hours  azithromycin  IVPB      buDESOnide   0.5 milliGRAM(s) Respule 0.5 milliGRAM(s) Inhalation every 12 hours  carvedilol 6.25 milliGRAM(s) Oral every 12 hours  cefTRIAXone Injectable. 1000 milliGRAM(s) IV Push every 24 hours  clopidogrel Tablet 75 milliGRAM(s) Oral daily  docusate sodium 100 milliGRAM(s) Oral three times a day  DULoxetine 60 milliGRAM(s) Oral daily  gabapentin 600 milliGRAM(s) Oral at bedtime  guaiFENesin  milliGRAM(s) Oral every 12 hours  heparin  Injectable 5000 Unit(s) SubCutaneous every 12 hours  isosorbide   mononitrate ER Tablet (IMDUR) 30 milliGRAM(s) Oral daily  losartan 50 milliGRAM(s) Oral daily  methylPREDNISolone sodium succinate Injectable 40 milliGRAM(s) IV Push every 12 hours  multivitamin 1 Tablet(s) Oral daily  pantoprazole    Tablet 40 milliGRAM(s) Oral before breakfast  simvastatin 20 milliGRAM(s) Oral at bedtime  sodium chloride 0.9%. 1000 milliLiter(s) (75 mL/Hr) IV Continuous <Continuous>      RADIOLOGY/EKG:    Lab Results:  CBC  CBC Full  -  ( 07 May 2019 07:17 )  WBC Count : 10.10 K/uL  RBC Count : 4.75 M/uL  Hemoglobin : 14.4 g/dL  Hematocrit : 42.6 %  Platelet Count - Automated : 165 K/uL  Mean Cell Volume : 89.7 fl  Mean Cell Hemoglobin : 30.3 pg  Mean Cell Hemoglobin Concentration : 33.8 gm/dL  Auto Neutrophil # : x  Auto Lymphocyte # : x  Auto Monocyte # : x  Auto Eosinophil # : x  Auto Basophil # : x  Auto Neutrophil % : x  Auto Lymphocyte % : x  Auto Monocyte % : x  Auto Eosinophil % : x  Auto Basophil % : x    .		Differential:	[] Automated		[] Manual  Chemistry                        14.4   10.10 )-----------( 165      ( 07 May 2019 07:17 )             42.6     05-07    131<L>  |  96  |  13  ----------------------------<  136<H>  3.7   |  26  |  0.67    Ca    8.7      07 May 2019 07:17  Phos  4.1       Mg     2.1         TPro  7.9  /  Alb  3.6  /  TBili  0.7  /  DBili  x   /  AST  31  /  ALT  34  /  AlkPhos  78      LIVER FUNCTIONS - ( 06 May 2019 11:29 )  Alb: 3.6 g/dL / Pro: 7.9 gm/dL / ALK PHOS: 78 U/L / ALT: 34 U/L / AST: 31 U/L / GGT: x           PT/INR - ( 06 May 2019 11:29 )   PT: 12.1 sec;   INR: 1.09 ratio         PTT - ( 06 May 2019 11:29 )  PTT:33.6 sec  Urinalysis Basic - ( 06 May 2019 11:29 )    Color: Yellow / Appearance: Clear / S.010 / pH: x  Gluc: x / Ketone: Small  / Bili: Negative / Urobili: Negative mg/dL   Blood: x / Protein: 30 mg/dL / Nitrite: Negative   Leuk Esterase: Negative / RBC: 3-5 /HPF / WBC Negative   Sq Epi: x / Non Sq Epi: Occasional / Bacteria: x      RADIOLOGY RESULTS:      	RADIOLOGY RESULTS:    	< from: Xray Chest 1 View-PORTABLE IMMEDIATE (19 @ 11:52) >      	IMPRESSION: Normal AP chest. HOSPITALIST PROGRESS NOTE:  SUBJECTIVE:  PCP:  Chief Complaint: Patient is a 87y old  Female who presents with a chief complaint of Dyspnea, cough (06 May 2019 15:32)      HPI:  87 year old female with PMH of Asthma, CAD S/P MI and stents X 5, HTN is here with complaints of SOB and Cough for the past week. No sick contacts.  States her productive cough got worse today and she was very short of breath. +wheezing, No fever, chills, NVD, HA, CP or abdominal pain. +decrease appetite.  Felt better in the ER after treatment.     : Above Reviewed; Improving having loose stool; Does have Hx of IBS;     Allergies:  No Known Allergies    REVIEW OF SYSTEMS:  See HPI. All other review of systems is negative unless indicated above.     OBJECTIVE  Physical Exam:  Vital Signs Last 24 Hrs  T(C): 37.2 (07 May 2019 11:56), Max: 37.2 (07 May 2019 11:56)  T(F): 99 (07 May 2019 11:56), Max: 99 (07 May 2019 11:56)  HR: 87 (07 May 2019 11:56) (79 - 100)  BP: 132/87 (07 May 2019 11:56) (132/87 - 180/99)  BP(mean): --  RR: 18 (07 May 2019 11:56) (17 - 25)  SpO2: 95% (07 May 2019 11:56) (91% - 100%)      PHYSICAL EXAM:  	Constitutional: NAD, awake and alert, well-developed  	Neurological: AAO x 3, no focal deficits  	HEENT: PERRLA, EOMI, MMM  	Neck: Soft and supple, No LAD, No JVD  	Respiratory: Breath sounds are clear bilaterally, + wheezing and Rhonchi  	Cardiovascular: S1 and S2, regular rate and rhythm; no Murmurs, gallops or rubs  	Gastrointestinal: Bowel Sounds present, soft, nontender, nondistended, no guarding, no rebound tenderness  	Back: No CVA tenderness   	Extremities: No peripheral edema  	Vascular: 2+ peripheral pulses  	Musculoskeletal: 5/5 strength b/l upper and lower extremities  	Skin: No rashes  	Breast: Deferred  Rectal: Deferred    MEDICATIONS  (STANDING):  ALBUTerol/ipratropium for Nebulization 3 milliLiter(s) Nebulizer every 6 hours  aspirin enteric coated 81 milliGRAM(s) Oral daily  azithromycin  IVPB 500 milliGRAM(s) IV Intermittent every 24 hours  azithromycin  IVPB      buDESOnide   0.5 milliGRAM(s) Respule 0.5 milliGRAM(s) Inhalation every 12 hours  carvedilol 6.25 milliGRAM(s) Oral every 12 hours  cefTRIAXone Injectable. 1000 milliGRAM(s) IV Push every 24 hours  clopidogrel Tablet 75 milliGRAM(s) Oral daily  docusate sodium 100 milliGRAM(s) Oral three times a day  DULoxetine 60 milliGRAM(s) Oral daily  gabapentin 600 milliGRAM(s) Oral at bedtime  guaiFENesin  milliGRAM(s) Oral every 12 hours  heparin  Injectable 5000 Unit(s) SubCutaneous every 12 hours  isosorbide   mononitrate ER Tablet (IMDUR) 30 milliGRAM(s) Oral daily  losartan 50 milliGRAM(s) Oral daily  methylPREDNISolone sodium succinate Injectable 40 milliGRAM(s) IV Push every 12 hours  multivitamin 1 Tablet(s) Oral daily  pantoprazole    Tablet 40 milliGRAM(s) Oral before breakfast  simvastatin 20 milliGRAM(s) Oral at bedtime  sodium chloride 0.9%. 1000 milliLiter(s) (75 mL/Hr) IV Continuous <Continuous>      RADIOLOGY/EKG:    Lab Results:  CBC  CBC Full  -  ( 07 May 2019 07:17 )  WBC Count : 10.10 K/uL  RBC Count : 4.75 M/uL  Hemoglobin : 14.4 g/dL  Hematocrit : 42.6 %  Platelet Count - Automated : 165 K/uL  Mean Cell Volume : 89.7 fl  Mean Cell Hemoglobin : 30.3 pg  Mean Cell Hemoglobin Concentration : 33.8 gm/dL  Auto Neutrophil # : x  Auto Lymphocyte # : x  Auto Monocyte # : x  Auto Eosinophil # : x  Auto Basophil # : x  Auto Neutrophil % : x  Auto Lymphocyte % : x  Auto Monocyte % : x  Auto Eosinophil % : x  Auto Basophil % : x    .		Differential:	[] Automated		[] Manual  Chemistry                        14.4   10.10 )-----------( 165      ( 07 May 2019 07:17 )             42.6     05-07    131<L>  |  96  |  13  ----------------------------<  136<H>  3.7   |  26  |  0.67    Ca    8.7      07 May 2019 07:17  Phos  4.1     05-07  Mg     2.1     -07    TPro  7.9  /  Alb  3.6  /  TBili  0.7  /  DBili  x   /  AST  31  /  ALT  34  /  AlkPhos  78  -    LIVER FUNCTIONS - ( 06 May 2019 11:29 )  Alb: 3.6 g/dL / Pro: 7.9 gm/dL / ALK PHOS: 78 U/L / ALT: 34 U/L / AST: 31 U/L / GGT: x           PT/INR - ( 06 May 2019 11:29 )   PT: 12.1 sec;   INR: 1.09 ratio         PTT - ( 06 May 2019 11:29 )  PTT:33.6 sec  Urinalysis Basic - ( 06 May 2019 11:29 )    Color: Yellow / Appearance: Clear / S.010 / pH: x  Gluc: x / Ketone: Small  / Bili: Negative / Urobili: Negative mg/dL   Blood: x / Protein: 30 mg/dL / Nitrite: Negative   Leuk Esterase: Negative / RBC: 3-5 /HPF / WBC Negative   Sq Epi: x / Non Sq Epi: Occasional / Bacteria: x      RADIOLOGY RESULTS:      	RADIOLOGY RESULTS:    	< from: Xray Chest 1 View-PORTABLE IMMEDIATE (19 @ 11:52) >      	IMPRESSION: Normal AP chest.

## 2019-05-07 NOTE — PHYSICAL THERAPY INITIAL EVALUATION ADULT - CRITERIA FOR SKILLED THERAPEUTIC INTERVENTIONS
rehab potential/functional limitations in following categories/risk reduction/prevention/anticipated discharge recommendation/therapy frequency/impairments found/predicted duration of therapy intervention/anticipated equipment needs at discharge

## 2019-05-07 NOTE — PHYSICAL THERAPY INITIAL EVALUATION ADULT - DIAGNOSIS, PT EVAL
asthmatic bronchitis, superimposed viral URI (+ Rhino/Parainfluenza 3 virus),impaired endurance/activity tolerance

## 2019-05-08 DIAGNOSIS — B34.8 OTHER VIRAL INFECTIONS OF UNSPECIFIED SITE: ICD-10-CM

## 2019-05-08 DIAGNOSIS — I10 ESSENTIAL (PRIMARY) HYPERTENSION: ICD-10-CM

## 2019-05-08 DIAGNOSIS — J20.4 ACUTE BRONCHITIS DUE TO PARAINFLUENZA VIRUS: ICD-10-CM

## 2019-05-08 DIAGNOSIS — I25.10 ATHEROSCLEROTIC HEART DISEASE OF NATIVE CORONARY ARTERY WITHOUT ANGINA PECTORIS: ICD-10-CM

## 2019-05-08 DIAGNOSIS — J20.9 ACUTE BRONCHITIS, UNSPECIFIED: ICD-10-CM

## 2019-05-08 LAB
ALDOST SERPL-MCNC: 6.6 NG/DL — SIGNIFICANT CHANGE UP
ANION GAP SERPL CALC-SCNC: 6 MMOL/L — SIGNIFICANT CHANGE UP (ref 5–17)
BUN SERPL-MCNC: 17 MG/DL — SIGNIFICANT CHANGE UP (ref 7–23)
CALCIUM SERPL-MCNC: 8.5 MG/DL — SIGNIFICANT CHANGE UP (ref 8.5–10.1)
CHLORIDE SERPL-SCNC: 100 MMOL/L — SIGNIFICANT CHANGE UP (ref 96–108)
CO2 SERPL-SCNC: 30 MMOL/L — SIGNIFICANT CHANGE UP (ref 22–31)
CREAT SERPL-MCNC: 0.66 MG/DL — SIGNIFICANT CHANGE UP (ref 0.5–1.3)
GLUCOSE SERPL-MCNC: 111 MG/DL — HIGH (ref 70–99)
POTASSIUM SERPL-MCNC: 3.3 MMOL/L — LOW (ref 3.5–5.3)
POTASSIUM SERPL-SCNC: 3.3 MMOL/L — LOW (ref 3.5–5.3)
SODIUM SERPL-SCNC: 136 MMOL/L — SIGNIFICANT CHANGE UP (ref 135–145)

## 2019-05-08 PROCEDURE — 99233 SBSQ HOSP IP/OBS HIGH 50: CPT

## 2019-05-08 RX ORDER — POTASSIUM CHLORIDE 20 MEQ
40 PACKET (EA) ORAL EVERY 4 HOURS
Qty: 0 | Refills: 0 | Status: COMPLETED | OUTPATIENT
Start: 2019-05-08 | End: 2019-05-08

## 2019-05-08 RX ADMIN — Medication 0.5 MILLIGRAM(S): at 21:09

## 2019-05-08 RX ADMIN — CLOPIDOGREL BISULFATE 75 MILLIGRAM(S): 75 TABLET, FILM COATED ORAL at 11:37

## 2019-05-08 RX ADMIN — Medication 3 MILLIGRAM(S): at 22:02

## 2019-05-08 RX ADMIN — Medication 40 MILLIGRAM(S): at 06:13

## 2019-05-08 RX ADMIN — CEFTRIAXONE 1000 MILLIGRAM(S): 500 INJECTION, POWDER, FOR SOLUTION INTRAMUSCULAR; INTRAVENOUS at 11:38

## 2019-05-08 RX ADMIN — Medication 40 MILLIEQUIVALENT(S): at 17:34

## 2019-05-08 RX ADMIN — AZITHROMYCIN 255 MILLIGRAM(S): 500 TABLET, FILM COATED ORAL at 14:43

## 2019-05-08 RX ADMIN — Medication 0.5 MILLIGRAM(S): at 07:41

## 2019-05-08 RX ADMIN — PANTOPRAZOLE SODIUM 40 MILLIGRAM(S): 20 TABLET, DELAYED RELEASE ORAL at 06:14

## 2019-05-08 RX ADMIN — Medication 3 MILLILITER(S): at 07:41

## 2019-05-08 RX ADMIN — Medication 600 MILLIGRAM(S): at 06:14

## 2019-05-08 RX ADMIN — SIMVASTATIN 20 MILLIGRAM(S): 20 TABLET, FILM COATED ORAL at 22:01

## 2019-05-08 RX ADMIN — HEPARIN SODIUM 5000 UNIT(S): 5000 INJECTION INTRAVENOUS; SUBCUTANEOUS at 06:13

## 2019-05-08 RX ADMIN — LOSARTAN POTASSIUM 50 MILLIGRAM(S): 100 TABLET, FILM COATED ORAL at 06:13

## 2019-05-08 RX ADMIN — Medication 3 MILLILITER(S): at 21:08

## 2019-05-08 RX ADMIN — CARVEDILOL PHOSPHATE 6.25 MILLIGRAM(S): 80 CAPSULE, EXTENDED RELEASE ORAL at 17:33

## 2019-05-08 RX ADMIN — Medication 40 MILLIEQUIVALENT(S): at 22:02

## 2019-05-08 RX ADMIN — HEPARIN SODIUM 5000 UNIT(S): 5000 INJECTION INTRAVENOUS; SUBCUTANEOUS at 17:33

## 2019-05-08 RX ADMIN — ISOSORBIDE MONONITRATE 30 MILLIGRAM(S): 60 TABLET, EXTENDED RELEASE ORAL at 11:38

## 2019-05-08 RX ADMIN — Medication 1 TABLET(S): at 11:37

## 2019-05-08 RX ADMIN — Medication 1 TABLET(S): at 17:33

## 2019-05-08 RX ADMIN — Medication 600 MILLIGRAM(S): at 17:33

## 2019-05-08 RX ADMIN — GABAPENTIN 600 MILLIGRAM(S): 400 CAPSULE ORAL at 22:01

## 2019-05-08 RX ADMIN — CARVEDILOL PHOSPHATE 6.25 MILLIGRAM(S): 80 CAPSULE, EXTENDED RELEASE ORAL at 06:14

## 2019-05-08 RX ADMIN — Medication 3 MILLILITER(S): at 13:47

## 2019-05-08 RX ADMIN — Medication 1 TABLET(S): at 06:13

## 2019-05-08 RX ADMIN — SODIUM CHLORIDE 75 MILLILITER(S): 9 INJECTION INTRAMUSCULAR; INTRAVENOUS; SUBCUTANEOUS at 06:13

## 2019-05-08 RX ADMIN — DULOXETINE HYDROCHLORIDE 60 MILLIGRAM(S): 30 CAPSULE, DELAYED RELEASE ORAL at 11:37

## 2019-05-08 NOTE — PROGRESS NOTE ADULT - SUBJECTIVE AND OBJECTIVE BOX
HOSPITALIST PROGRESS NOTE:  SUBJECTIVE:  PCP:  Chief Complaint: Patient is a 87y old  Female who presents with a chief complaint of Dyspnea, cough (06 May 2019 15:32)      HPI:  87 year old female with PMH of Asthma, CAD S/P MI and stents X 5, HTN is here with complaints of SOB and Cough for the past week. No sick contacts.  States her productive cough got worse today and she was very short of breath. +wheezing, No fever, chills, NVD, HA, CP or abdominal pain. +decrease appetite.  Felt better in the ER after treatment.     : Above Reviewed; Improving having loose stool; Does have Hx of IBS;   :  patient still wheezing and coughing more today; Steroids were decreased yesterday since she was anxious;    Allergies:  No Known Allergies    REVIEW OF SYSTEMS:  See HPI. All other review of systems is negative unless indicated above.     OBJECTIVE  Physical Exam:  Vital Signs Last 24 Hrs  T(C): 36.7 (08 May 2019 10:40), Max: 37 (08 May 2019 04:32)  T(F): 98.1 (08 May 2019 10:40), Max: 98.6 (08 May 2019 04:32)  HR: 88 (08 May 2019 14:11) (84 - 99)  BP: 138/66 (08 May 2019 10:40) (135/70 - 146/56)  BP(mean): --  RR: 17 (08 May 2019 10:40) (16 - 18)  SpO2: 95% (08 May 2019 10:40) (95% - 99%)    PHYSICAL EXAM:  	Constitutional: NAD, awake and alert, well-developed  	Neurological: AAO x 3, no focal deficits  	HEENT: PERRLA, EOMI, MMM  	Neck: Soft and supple, No LAD, No JVD  	Respiratory: Breath sounds are clear bilaterally, + wheezing and Rhonchi  	Cardiovascular: S1 and S2, regular rate and rhythm; no Murmurs, gallops or rubs  	Gastrointestinal: Bowel Sounds present, soft, nontender, nondistended, no guarding, no rebound tenderness  	Back: No CVA tenderness   	Extremities: No peripheral edema  	Vascular: 2+ peripheral pulses  	Musculoskeletal: 5/5 strength b/l upper and lower extremities  	Skin: No rashes  	Breast: Deferred  Rectal: Deferred    MEDICATIONS  (STANDING):  ALBUTerol/ipratropium for Nebulization 3 milliLiter(s) Nebulizer every 6 hours  aspirin enteric coated 81 milliGRAM(s) Oral daily  azithromycin  IVPB 500 milliGRAM(s) IV Intermittent every 24 hours  azithromycin  IVPB      buDESOnide   0.5 milliGRAM(s) Respule 0.5 milliGRAM(s) Inhalation every 12 hours  carvedilol 6.25 milliGRAM(s) Oral every 12 hours  cefTRIAXone Injectable. 1000 milliGRAM(s) IV Push every 24 hours  clopidogrel Tablet 75 milliGRAM(s) Oral daily  docusate sodium 100 milliGRAM(s) Oral three times a day  DULoxetine 60 milliGRAM(s) Oral daily  gabapentin 600 milliGRAM(s) Oral at bedtime  guaiFENesin  milliGRAM(s) Oral every 12 hours  heparin  Injectable 5000 Unit(s) SubCutaneous every 12 hours  isosorbide   mononitrate ER Tablet (IMDUR) 30 milliGRAM(s) Oral daily  losartan 50 milliGRAM(s) Oral daily  methylPREDNISolone sodium succinate Injectable 40 milliGRAM(s) IV Push every 12 hours  multivitamin 1 Tablet(s) Oral daily  pantoprazole    Tablet 40 milliGRAM(s) Oral before breakfast  simvastatin 20 milliGRAM(s) Oral at bedtime  sodium chloride 0.9%. 1000 milliLiter(s) (75 mL/Hr) IV Continuous <Continuous>      RADIOLOGY/EKG:    Lab Results:  CBC  CBC Full  -  ( 07 May 2019 07:17 )  WBC Count : 10.10 K/uL  RBC Count : 4.75 M/uL  Hemoglobin : 14.4 g/dL  Hematocrit : 42.6 %  Platelet Count - Automated : 165 K/uL  Mean Cell Volume : 89.7 fl  Mean Cell Hemoglobin : 30.3 pg  Mean Cell Hemoglobin Concentration : 33.8 gm/dL  Auto Neutrophil # : x  Auto Lymphocyte # : x  Auto Monocyte # : x  Auto Eosinophil # : x  Auto Basophil # : x  Auto Neutrophil % : x  Auto Lymphocyte % : x  Auto Monocyte % : x  Auto Eosinophil % : x  Auto Basophil % : x    .		Differential:	[] Automated		[] Manual  Chemistry                        14.4   10.10 )-----------( 165      ( 07 May 2019 07:17 )             42.6     05-08    136  |  100  |  17  ----------------------------<  111<H>  3.3<L>   |  30  |  0.66    Ca    8.5      08 May 2019 06:28  Phos  4.1       Mg     2.1     -        MICROBIOLOGY/CULTURES:  Culture Results:   No growth to date. ( @ 11:29)  Culture Results:   No growth to date. ( @ 11:29)  Culture Results:   <10,000 CFU/mL Normal Urogenital Taylor ( @ 11:29)      Urinalysis Basic - ( 06 May 2019 11:29 )    Color: Yellow / Appearance: Clear / S.010 / pH: x  Gluc: x / Ketone: Small  / Bili: Negative / Urobili: Negative mg/dL   Blood: x / Protein: 30 mg/dL / Nitrite: Negative   Leuk Esterase: Negative / RBC: 3-5 /HPF / WBC Negative   Sq Epi: x / Non Sq Epi: Occasional / Bacteria: x      	RADIOLOGY RESULTS:    	< from: Xray Chest 1 View-PORTABLE IMMEDIATE (19 @ 11:52) >      	IMPRESSION: Normal AP chest.

## 2019-05-08 NOTE — PROGRESS NOTE ADULT - SUBJECTIVE AND OBJECTIVE BOX
Subjective:  less sob but still wheezing  afebrile  cough with phlegm    MEDICATIONS  (STANDING):  ALBUTerol/ipratropium for Nebulization 3 milliLiter(s) Nebulizer every 6 hours  aspirin enteric coated 81 milliGRAM(s) Oral daily  azithromycin  IVPB 500 milliGRAM(s) IV Intermittent every 24 hours  azithromycin  IVPB      buDESOnide   0.5 milliGRAM(s) Respule 0.5 milliGRAM(s) Inhalation every 12 hours  carvedilol 6.25 milliGRAM(s) Oral every 12 hours  cefTRIAXone Injectable. 1000 milliGRAM(s) IV Push every 24 hours  clopidogrel Tablet 75 milliGRAM(s) Oral daily  docusate sodium 100 milliGRAM(s) Oral three times a day  DULoxetine 60 milliGRAM(s) Oral daily  gabapentin 600 milliGRAM(s) Oral at bedtime  guaiFENesin  milliGRAM(s) Oral every 12 hours  heparin  Injectable 5000 Unit(s) SubCutaneous every 12 hours  isosorbide   mononitrate ER Tablet (IMDUR) 30 milliGRAM(s) Oral daily  lactobacillus acidophilus 1 Tablet(s) Oral two times a day  losartan 50 milliGRAM(s) Oral daily  methylPREDNISolone sodium succinate Injectable 40 milliGRAM(s) IV Push daily  multivitamin 1 Tablet(s) Oral daily  pantoprazole    Tablet 40 milliGRAM(s) Oral before breakfast  simvastatin 20 milliGRAM(s) Oral at bedtime  sodium chloride 0.9%. 1000 milliLiter(s) (75 mL/Hr) IV Continuous <Continuous>    MEDICATIONS  (PRN):  acetaminophen   Tablet .. 325 milliGRAM(s) Oral every 4 hours PRN Temp greater or equal to 38C (100.4F), Mild Pain (1 - 3)  melatonin 3 milliGRAM(s) Oral at bedtime PRN Insomnia  ondansetron Injectable 4 milliGRAM(s) IV Push every 6 hours PRN Nausea  senna 2 Tablet(s) Oral at bedtime PRN Constipation      Allergies    No Known Allergies    Intolerances        REVIEW OF SYSTEMS:    CONSTITUTIONAL:  As per HPI.  HEENT:  Eyes:  No diplopia or blurred vision. ENT:  No earache, sore throat or runny nose.  CARDIOVASCULAR:  No pressure, squeezing, tightness, heaviness or aching about the chest, neck, axilla or epigastrium.  RESPIRATORY:  No cough, shortness of breath, PND or orthopnea.  GASTROINTESTINAL:  No nausea, vomiting or diarrhea.  GENITOURINARY:  No dysuria, frequency or urgency.  MUSCULOSKELETAL:  no joint pain, deformity, tenderness  EXTREMITIES: no clubbing cyanosis,edema  SKIN:  No change in skin, hair or nails.  NEUROLOGIC:  No paresthesias, fasciculations, seizures or weakness.  PSYCHIATRIC:  No disorder of thought or mood.  ENDOCRINE:  No heat or cold intolerance, polyuria or polydipsia.  HEMATOLOGICAL:  No easy bruising or bleedings:    Vital Signs Last 24 Hrs  T(C): 37 (08 May 2019 04:32), Max: 37.2 (07 May 2019 11:56)  T(F): 98.6 (08 May 2019 04:32), Max: 99 (07 May 2019 11:56)  HR: 99 (08 May 2019 04:32) (87 - 99)  BP: 146/56 (08 May 2019 04:32) (132/87 - 146/56)  BP(mean): --  RR: 16 (08 May 2019 04:32) (16 - 18)  SpO2: 97% (08 May 2019 04:32) (95% - 99%)    PHYSICAL EXAMINATION:  SKIN: no rashes  HEAD: NC/AT  EYES: PERRLA, EOMI  EARS: TM's intact  NOSE: no abnormalities  NECK:  Supple. No lymphadenopathy. Jugular venous pressure not elevated. Carotids equal.   HEART:   The cardiac impulse has a normal quality. Reg., Nl S1 and S2.  There are no murmurs, rubs or gallops noted  CHEST: bilat expiratory ronchi  ABDOMEN:  Soft and nontender.   EXTREMITIES:  no C/C/E  NEURO: AAO x 3, no focal deficts       LABS:                        14.4   10.10 )-----------( 165      ( 07 May 2019 07:17 )             42.6     05-08    136  |  100  |  17  ----------------------------<  111<H>  3.3<L>   |  30  |  0.66    Ca    8.5      08 May 2019 06:28  Phos  4.1     05-07  Mg     2.1     05-07    TPro  7.9  /  Alb  3.6  /  TBili  0.7  /  DBili  x   /  AST  31  /  ALT  34  /  AlkPhos  78  05-06    PT/INR - ( 06 May 2019 11:29 )   PT: 12.1 sec;   INR: 1.09 ratio         PTT - ( 06 May 2019 11:29 )  PTT:33.6 sec  Urinalysis Basic - ( 06 May 2019 11:29 )    Color: Yellow / Appearance: Clear / S.010 / pH: x  Gluc: x / Ketone: Small  / Bili: Negative / Urobili: Negative mg/dL   Blood: x / Protein: 30 mg/dL / Nitrite: Negative   Leuk Esterase: Negative / RBC: 3-5 /HPF / WBC Negative   Sq Epi: x / Non Sq Epi: Occasional / Bacteria: x        RADIOLOGY & ADDITIONAL TESTS:

## 2019-05-08 NOTE — PROGRESS NOTE ADULT - ASSESSMENT
- cont O2/bronchodilators/budesonide  - maintain iv steroids today  - can change to po abx  - ambulate  - dvt proph

## 2019-05-08 NOTE — PROGRESS NOTE ADULT - ASSESSMENT
*Dyspnea and Cough 2ndry to Asthmatic Bronchitis with Superimposed Viral Syndrome  -Positive Rhino/Parainfluenza 3  -CXR clear   -IVF  -solumedrol 40mg IV QD   -nebs ATC  -Pulmicort  -Rocephin#3 and S/P Zithromax #3  -Pulm consult appreciated     *Leukocytosis 2ndry to above - resolved   -continue IV abx  -continue to monitor    *Hyponatremia  most likely 2ndry to Diuretics and Hypovolemia - resolved   -S/P IVF  -Monitor BMP    *Diarrhea   -continue to monitor  -lactobacillus  -if no improvement need to send stool for GI PCR and Cdiff    *HTN  -continue Losartan, Imdur and Carvedilol  -Hold HCTZ    *CAD S/P Stent X 5 and MI/Hyperlipidemia  -continue Home meds    *DVT ppx  -Heparin SQ

## 2019-05-09 ENCOUNTER — TRANSCRIPTION ENCOUNTER (OUTPATIENT)
Age: 84
End: 2019-05-09

## 2019-05-09 VITALS
HEART RATE: 81 BPM | OXYGEN SATURATION: 95 % | SYSTOLIC BLOOD PRESSURE: 148 MMHG | TEMPERATURE: 98 F | RESPIRATION RATE: 18 BRPM | DIASTOLIC BLOOD PRESSURE: 73 MMHG

## 2019-05-09 PROCEDURE — 99233 SBSQ HOSP IP/OBS HIGH 50: CPT

## 2019-05-09 RX ORDER — CEFUROXIME AXETIL 250 MG
1 TABLET ORAL
Qty: 6 | Refills: 0
Start: 2019-05-09 | End: 2019-05-11

## 2019-05-09 RX ORDER — ALBUTEROL 90 UG/1
2 AEROSOL, METERED ORAL
Qty: 1 | Refills: 0
Start: 2019-05-09 | End: 2019-06-07

## 2019-05-09 RX ORDER — ASPIRIN/CALCIUM CARB/MAGNESIUM 324 MG
1 TABLET ORAL
Qty: 0 | Refills: 0 | DISCHARGE
Start: 2019-05-09

## 2019-05-09 RX ORDER — LACTOBACILLUS ACIDOPHILUS 100MM CELL
1 CAPSULE ORAL
Qty: 6 | Refills: 0
Start: 2019-05-09 | End: 2019-05-11

## 2019-05-09 RX ADMIN — Medication 1 TABLET(S): at 05:27

## 2019-05-09 RX ADMIN — Medication 0.5 MILLIGRAM(S): at 08:15

## 2019-05-09 RX ADMIN — DULOXETINE HYDROCHLORIDE 60 MILLIGRAM(S): 30 CAPSULE, DELAYED RELEASE ORAL at 11:04

## 2019-05-09 RX ADMIN — CEFTRIAXONE 1000 MILLIGRAM(S): 500 INJECTION, POWDER, FOR SOLUTION INTRAMUSCULAR; INTRAVENOUS at 10:47

## 2019-05-09 RX ADMIN — ISOSORBIDE MONONITRATE 30 MILLIGRAM(S): 60 TABLET, EXTENDED RELEASE ORAL at 11:05

## 2019-05-09 RX ADMIN — PANTOPRAZOLE SODIUM 40 MILLIGRAM(S): 20 TABLET, DELAYED RELEASE ORAL at 05:27

## 2019-05-09 RX ADMIN — CLOPIDOGREL BISULFATE 75 MILLIGRAM(S): 75 TABLET, FILM COATED ORAL at 11:05

## 2019-05-09 RX ADMIN — Medication 1 TABLET(S): at 11:05

## 2019-05-09 RX ADMIN — HEPARIN SODIUM 5000 UNIT(S): 5000 INJECTION INTRAVENOUS; SUBCUTANEOUS at 05:27

## 2019-05-09 RX ADMIN — Medication 600 MILLIGRAM(S): at 05:27

## 2019-05-09 RX ADMIN — LOSARTAN POTASSIUM 50 MILLIGRAM(S): 100 TABLET, FILM COATED ORAL at 05:27

## 2019-05-09 RX ADMIN — Medication 40 MILLIGRAM(S): at 05:27

## 2019-05-09 RX ADMIN — Medication 3 MILLILITER(S): at 08:15

## 2019-05-09 RX ADMIN — CARVEDILOL PHOSPHATE 6.25 MILLIGRAM(S): 80 CAPSULE, EXTENDED RELEASE ORAL at 05:27

## 2019-05-09 NOTE — PROGRESS NOTE ADULT - ASSESSMENT
*Dyspnea and Cough 2ndry to Asthmatic Bronchitis with Superimposed Viral Syndrome  -Positive Rhino/Parainfluenza 3  -CXR clear   -IVF  -solumedrol 40mg IV QD   -nebs ATC  -Pulmicort  -Rocephin#4 and S/P Zithromax #3  -Pulm consult appreciated     *Leukocytosis 2ndry to above - resolved   -continue IV abx  -continue to monitor    *Hyponatremia  most likely 2ndry to Diuretics and Hypovolemia - resolved   -S/P IVF  -Monitor BMP    *Diarrhea   -continue to monitor  -lactobacillus  -if no improvement need to send stool for GI PCR and Cdiff    *HTN  -continue Losartan, Imdur and Carvedilol  -Hold HCTZ    *CAD S/P Stent X 5 and MI/Hyperlipidemia  -continue Home meds    *DVT ppx  -Heparin SQ

## 2019-05-09 NOTE — DISCHARGE NOTE PROVIDER - HOSPITAL COURSE
HOSPITALIST PROGRESS NOTE:    SUBJECTIVE:    PCP:    Chief Complaint: Patient is a 87y old  Female who presents with a chief complaint of Dyspnea, cough (06 May 2019 15:32)            HPI:    87 year old female with PMH of Asthma, CAD S/P MI and stents X 5, HTN is here with complaints of SOB and Cough for the past week. No sick contacts.  States her productive cough got worse today and she was very short of breath. +wheezing, No fever, chills, NVD, HA, CP or abdominal pain. +decrease appetite.  Felt better in the ER after treatment.         5/7:  Above Reviewed; Improving having loose stool; Does have Hx of IBS;     5/8:  patient still wheezing and coughing more today; Steroids were decreased yesterday since she was anxious;    5/9:  Patient has no complaints; feels better seen by Pulm and cleared for D/C            *Dyspnea and Cough 2ndry to Asthmatic Bronchitis with Superimposed Viral Syndrome    -Positive Rhino/Parainfluenza 3    -CXR clear     -IVF    -solumedrol 40mg IV QD     -nebs ATC    -Pulmicort    -Rocephin#4 and S/P Zithromax #3    -Pulm consult appreciated         *Leukocytosis 2ndry to above - resolved     -continue IV abx    -continue to monitor        *Hyponatremia  most likely 2ndry to Diuretics and Hypovolemia - resolved     -S/P IVF    -Monitor BMP        *Diarrhea     -continue to monitor    -lactobacillus    -if no improvement need to send stool for GI PCR and Cdiff        *HTN    -continue Losartan, Imdur and Carvedilol    -Hold HCTZ        *CAD S/P Stent X 5 and MI/Hyperlipidemia    -continue Home meds        *DVT ppx    -Heparin SQ

## 2019-05-09 NOTE — CONSULT NOTE ADULT - ASSESSMENT
- cont o@/bronchodilators  - add pulmicort  - iv steroids  - dvt proph
- cont O2/bronchodilators/budesonide  - has parainfluenza virus  - maintain iv steroids today  - ambulate  - dvt proph

## 2019-05-09 NOTE — PROGRESS NOTE ADULT - SUBJECTIVE AND OBJECTIVE BOX
HOSPITALIST PROGRESS NOTE:  SUBJECTIVE:  PCP:  Chief Complaint: Patient is a 87y old  Female who presents with a chief complaint of Dyspnea, cough (06 May 2019 15:32)      HPI:  87 year old female with PMH of Asthma, CAD S/P MI and stents X 5, HTN is here with complaints of SOB and Cough for the past week. No sick contacts.  States her productive cough got worse today and she was very short of breath. +wheezing, No fever, chills, NVD, HA, CP or abdominal pain. +decrease appetite.  Felt better in the ER after treatment.     :  Above Reviewed; Improving having loose stool; Does have Hx of IBS;   :  patient still wheezing and coughing more today; Steroids were decreased yesterday since she was anxious;  :  Patient has no complaints; feels better seen by Pulm and cleared for D/C    Allergies:  No Known Allergies    REVIEW OF SYSTEMS:  See HPI. All other review of systems is negative unless indicated above.     OBJECTIVE  Physical Exam:  Vital Signs Last 24 Hrs  T(C): 36.4 (09 May 2019 11:33), Max: 36.9 (08 May 2019 17:25)  T(F): 97.6 (09 May 2019 11:33), Max: 98.5 (08 May 2019 17:25)  HR: 81 (09 May 2019 11:33) (81 - 96)  BP: 148/73 (09 May 2019 11:33) (148/73 - 166/86)  BP(mean): --  RR: 18 (09 May 2019 11:33) (18 - 18)  SpO2: 95% (09 May 2019 11:33) (95% - 98%)    PHYSICAL EXAM:  	Constitutional: NAD, awake and alert, well-developed  	Neurological: AAO x 3, no focal deficits  	HEENT: PERRLA, EOMI, MMM  	Neck: Soft and supple, No LAD, No JVD  	Respiratory: Breath sounds are clear bilaterally, + wheezing and Rhonchi _ resolved   	Cardiovascular: S1 and S2, regular rate and rhythm; no Murmurs, gallops or rubs  	Gastrointestinal: Bowel Sounds present, soft, nontender, nondistended, no guarding, no rebound tenderness  	Back: No CVA tenderness   	Extremities: No peripheral edema  	Vascular: 2+ peripheral pulses  	Musculoskeletal: 5/5 strength b/l upper and lower extremities  	Skin: No rashes  	Breast: Deferred  Rectal: Deferred    MEDICATIONS  (STANDING):  ALBUTerol/ipratropium for Nebulization 3 milliLiter(s) Nebulizer every 6 hours  aspirin enteric coated 81 milliGRAM(s) Oral daily  azithromycin  IVPB 500 milliGRAM(s) IV Intermittent every 24 hours  azithromycin  IVPB      buDESOnide   0.5 milliGRAM(s) Respule 0.5 milliGRAM(s) Inhalation every 12 hours  carvedilol 6.25 milliGRAM(s) Oral every 12 hours  cefTRIAXone Injectable. 1000 milliGRAM(s) IV Push every 24 hours  clopidogrel Tablet 75 milliGRAM(s) Oral daily  docusate sodium 100 milliGRAM(s) Oral three times a day  DULoxetine 60 milliGRAM(s) Oral daily  gabapentin 600 milliGRAM(s) Oral at bedtime  guaiFENesin  milliGRAM(s) Oral every 12 hours  heparin  Injectable 5000 Unit(s) SubCutaneous every 12 hours  isosorbide   mononitrate ER Tablet (IMDUR) 30 milliGRAM(s) Oral daily  losartan 50 milliGRAM(s) Oral daily  methylPREDNISolone sodium succinate Injectable 40 milliGRAM(s) IV Push every 12 hours  multivitamin 1 Tablet(s) Oral daily  pantoprazole    Tablet 40 milliGRAM(s) Oral before breakfast  simvastatin 20 milliGRAM(s) Oral at bedtime  sodium chloride 0.9%. 1000 milliLiter(s) (75 mL/Hr) IV Continuous <Continuous>      RADIOLOGY/EKG:    Lab Results:  CBC    .		Differential:	[] Automated		[] Manual  Chemistry        136  |  100  |  17  ----------------------------<  111<H>  3.3<L>   |  30  |  0.66    Ca    8.5      08 May 2019 06:28      MICROBIOLOGY/CULTURES:  Culture Results:   No growth to date. ( @ 11:29)  Culture Results:   No growth to date. ( @ 11:29)  Culture Results:   <10,000 CFU/mL Normal Urogenital Taylor ( @ 11:29)    Urinalysis Basic - ( 06 May 2019 11:29 )    Color: Yellow / Appearance: Clear / S.010 / pH: x  Gluc: x / Ketone: Small  / Bili: Negative / Urobili: Negative mg/dL   Blood: x / Protein: 30 mg/dL / Nitrite: Negative   Leuk Esterase: Negative / RBC: 3-5 /HPF / WBC Negative   Sq Epi: x / Non Sq Epi: Occasional / Bacteria: x      	RADIOLOGY RESULTS:    	< from: Xray Chest 1 View-PORTABLE IMMEDIATE (19 @ 11:52) >      	IMPRESSION: Normal AP chest.

## 2019-05-09 NOTE — DISCHARGE NOTE PROVIDER - NSDCCPCAREPLAN_GEN_ALL_CORE_FT
PRINCIPAL DISCHARGE DIAGNOSIS  Diagnosis: Asthmatic bronchitis with acute exacerbation  Assessment and Plan of Treatment: Dyspnea and Cough 2ndry to Asthmatic Bronchitis with Superimposed Viral Syndrome  -Positive Rhino/Parainfluenza 3  -D/C on ceftin, Albuterol HFA, Prednsione taper  -AMBROCIO Duncan next week      SECONDARY DISCHARGE DIAGNOSES  Diagnosis: Asthma exacerbation  Assessment and Plan of Treatment:

## 2019-05-09 NOTE — CONSULT NOTE ADULT - SUBJECTIVE AND OBJECTIVE BOX
HPI:  87 year old female with PMH of Asthma, CAD S/P MI and stents X 5, HTN is here with complaints of SOB and Cough for the past week. No sick contacts.  States her productive cough got worse today and she was very short of breath. +wheezing, No fever, chills, NVD, HA, CP or abdominal pain. +decrease appetite.  Felt better in the ER after treatment.   History as above      Past Medical History:  CAD (coronary artery disease)    Chronic back pain    Dyslipidemia    Hypertension.    Past Surgical History:  Carpal tunnel syndrome    History of cataract surgery    History of coronary artery stent placement  LAD  History of hysterectomy    History of lumbar laminectomy for spinal cord decompression    History of total knee replacement, bilateral    Malfunction of spinal cord stimulator  removed.    Social History:  Lives at home. No tobacco, alcohol or illicit drug use    Family History:  unsure of age and cause of death of both parents (06 May 2019 15:32)      PAST MEDICAL & SURGICAL HISTORY:  Asthma  Bronchitis: April 2019  Macular degeneration: Legally Blind  MI (myocardial infarction)  Hypertension  Chronic back pain  Dyslipidemia  CAD (coronary artery disease)  History of coronary artery stent placement: LAD  Carpal tunnel syndrome  Malfunction of spinal cord stimulator: removed  History of total knee replacement, bilateral  History of lumbar laminectomy for spinal cord decompression  History of hysterectomy  History of cataract surgery      MEDICATIONS  (STANDING):  ALBUTerol/ipratropium for Nebulization 3 milliLiter(s) Nebulizer every 6 hours  aspirin enteric coated 81 milliGRAM(s) Oral daily  buDESOnide   0.5 milliGRAM(s) Respule 0.5 milliGRAM(s) Inhalation every 12 hours  carvedilol 6.25 milliGRAM(s) Oral every 12 hours  cefTRIAXone Injectable. 1000 milliGRAM(s) IV Push every 24 hours  clopidogrel Tablet 75 milliGRAM(s) Oral daily  docusate sodium 100 milliGRAM(s) Oral three times a day  DULoxetine 60 milliGRAM(s) Oral daily  gabapentin 600 milliGRAM(s) Oral at bedtime  guaiFENesin  milliGRAM(s) Oral every 12 hours  heparin  Injectable 5000 Unit(s) SubCutaneous every 12 hours  isosorbide   mononitrate ER Tablet (IMDUR) 30 milliGRAM(s) Oral daily  lactobacillus acidophilus 1 Tablet(s) Oral two times a day  losartan 50 milliGRAM(s) Oral daily  methylPREDNISolone sodium succinate Injectable 40 milliGRAM(s) IV Push daily  multivitamin 1 Tablet(s) Oral daily  pantoprazole    Tablet 40 milliGRAM(s) Oral before breakfast  simvastatin 20 milliGRAM(s) Oral at bedtime    MEDICATIONS  (PRN):  acetaminophen   Tablet .. 325 milliGRAM(s) Oral every 4 hours PRN Temp greater or equal to 38C (100.4F), Mild Pain (1 - 3)  melatonin 3 milliGRAM(s) Oral at bedtime PRN Insomnia  ondansetron Injectable 4 milliGRAM(s) IV Push every 6 hours PRN Nausea  senna 2 Tablet(s) Oral at bedtime PRN Constipation      Allergies    No Known Allergies    Intolerances        SOCIAL HISTORY: Denies tobacco, etoh abuse or illicit drug use    FAMILY HISTORY:  No pertinent family history in first degree relatives      Vital Signs Last 24 Hrs  T(C): 36.7 (08 May 2019 23:07), Max: 36.9 (08 May 2019 17:25)  T(F): 98.1 (08 May 2019 23:07), Max: 98.5 (08 May 2019 17:25)  HR: 90 (09 May 2019 08:15) (88 - 96)  BP: 154/92 (08 May 2019 23:07) (138/66 - 166/86)  BP(mean): --  RR: 18 (08 May 2019 23:07) (17 - 18)  SpO2: 95% (08 May 2019 23:07) (95% - 98%)    REVIEW OF SYSTEMS:    CONSTITUTIONAL:  As per HPI.  SKIN: no rashes  HEENT:  Eyes:  No diplopia or blurred vision. ENT:  No earache, sore throat or runny nose.  CARDIOVASCULAR:  No pressure, squeezing, tightness, heaviness or aching about the chest, neck, axilla or epigastrium.  RESPIRATORY:  No cough, shortness of breath, PND or orthopnea.  GASTROINTESTINAL:  No nausea, vomiting or diarrhea.  GENITOURINARY:  No dysuria, frequency or urgency.  MUSCULOSKELETAL:  As per HPI.  SKIN:  No change in skin, hair or nails.  NEUROLOGIC:  No paresthesias, fasciculations, seizures or weakness.  PSYCHIATRIC:  No disorder of thought or mood.  ENDOCRINE:  No heat or cold intolerance, polyuria or polydipsia.  HEMATOLOGICAL:  No easy bruising or bleedings:  .     PHYSICAL EXAMINATION:    GENERAL APPEARANCE:  Pt. is not currently dyspneic, in no distress. Pt. is alert, oriented, and pleasant.  HEENT:  Pupils are normal and react normally. No icterus. Mucous membranes well colored.  NECK:  Supple. No lymphadenopathy. Jugular venous pressure not elevated. Carotids equal.   HEART:   The cardiac impulse has a normal quality. Regular. Normal S1 and S2. There are no murmurs, rubs or gallops noted  CHEST:  Chest is clear to auscultation. Normal respiratory effort.  ABDOMEN:  Soft and nontender.   EXTREMITIES:  There is no cyanosis, clubbing or edema.   SKIN:  No rash or significant lesions are noted.    LABS:    05-08    136  |  100  |  17  ----------------------------<  111<H>  3.3<L>   |  30  |  0.66    Ca    8.5      08 May 2019 06:28                    RADIOLOGY & ADDITIONAL STUDIES:
HPI:  87 year old female with PMH of Asthma, CAD S/P MI and stents X 5, HTN is here with complaints of SOB and Cough for the past week. No sick contacts.  States her productive cough got worse today and she was very short of breath. +wheezing, No fever, chills, NVD, HA, CP or abdominal pain. +decrease appetite.  Felt better in the ER after treatment.   Hx as above. Less sob today.      Past Medical History:  CAD (coronary artery disease)    Chronic back pain    Dyslipidemia    Hypertension.    Past Surgical History:  Carpal tunnel syndrome    History of cataract surgery    History of coronary artery stent placement  LAD  History of hysterectomy    History of lumbar laminectomy for spinal cord decompression    History of total knee replacement, bilateral    Malfunction of spinal cord stimulator  removed.    Social History:  Lives at home. No tobacco, alcohol or illicit drug use    Family History:  unsure of age and cause of death of both parents (06 May 2019 15:32)      PAST MEDICAL & SURGICAL HISTORY:  Asthma  Bronchitis: April 2019  Macular degeneration: Legally Blind  MI (myocardial infarction)  Hypertension  Chronic back pain  Dyslipidemia  CAD (coronary artery disease)  History of coronary artery stent placement: LAD  Carpal tunnel syndrome  Malfunction of spinal cord stimulator: removed  History of total knee replacement, bilateral  History of lumbar laminectomy for spinal cord decompression  History of hysterectomy  History of cataract surgery      MEDICATIONS  (STANDING):  ALBUTerol/ipratropium for Nebulization 3 milliLiter(s) Nebulizer every 6 hours  aspirin enteric coated 81 milliGRAM(s) Oral daily  buDESOnide   0.5 milliGRAM(s) Respule 0.5 milliGRAM(s) Inhalation every 12 hours  carvedilol 6.25 milliGRAM(s) Oral every 12 hours  cefTRIAXone Injectable. 1000 milliGRAM(s) IV Push every 24 hours  clopidogrel Tablet 75 milliGRAM(s) Oral daily  docusate sodium 100 milliGRAM(s) Oral three times a day  DULoxetine 60 milliGRAM(s) Oral daily  gabapentin 600 milliGRAM(s) Oral at bedtime  guaiFENesin  milliGRAM(s) Oral every 12 hours  heparin  Injectable 5000 Unit(s) SubCutaneous every 12 hours  isosorbide   mononitrate ER Tablet (IMDUR) 30 milliGRAM(s) Oral daily  lactobacillus acidophilus 1 Tablet(s) Oral two times a day  losartan 50 milliGRAM(s) Oral daily  methylPREDNISolone sodium succinate Injectable 40 milliGRAM(s) IV Push daily  multivitamin 1 Tablet(s) Oral daily  pantoprazole    Tablet 40 milliGRAM(s) Oral before breakfast  simvastatin 20 milliGRAM(s) Oral at bedtime    MEDICATIONS  (PRN):  acetaminophen   Tablet .. 325 milliGRAM(s) Oral every 4 hours PRN Temp greater or equal to 38C (100.4F), Mild Pain (1 - 3)  melatonin 3 milliGRAM(s) Oral at bedtime PRN Insomnia  ondansetron Injectable 4 milliGRAM(s) IV Push every 6 hours PRN Nausea  senna 2 Tablet(s) Oral at bedtime PRN Constipation      Allergies    No Known Allergies    Intolerances        SOCIAL HISTORY: Denies tobacco, etoh abuse or illicit drug use    FAMILY HISTORY:  No pertinent family history in first degree relatives      Vital Signs Last 24 Hrs  T(C): 36.7 (08 May 2019 23:07), Max: 36.9 (08 May 2019 17:25)  T(F): 98.1 (08 May 2019 23:07), Max: 98.5 (08 May 2019 17:25)  HR: 90 (08 May 2019 23:07) (88 - 96)  BP: 154/92 (08 May 2019 23:07) (138/66 - 166/86)  BP(mean): --  RR: 18 (08 May 2019 23:07) (17 - 18)  SpO2: 95% (08 May 2019 23:07) (95% - 98%)    REVIEW OF SYSTEMS:    CONSTITUTIONAL:  As per HPI.  SKIN: no rashes  HEENT:  Eyes:  No diplopia or blurred vision. ENT:  No earache, sore throat or runny nose.  CARDIOVASCULAR:  No pressure, squeezing, tightness, heaviness or aching about the chest, neck, axilla or epigastrium.  RESPIRATORY:  No cough, shortness of breath, PND or orthopnea.  GASTROINTESTINAL:  No nausea, vomiting or diarrhea.  GENITOURINARY:  No dysuria, frequency or urgency.  MUSCULOSKELETAL:  As per HPI.  SKIN:  No change in skin, hair or nails.  NEUROLOGIC:  No paresthesias, fasciculations, seizures or weakness.  PSYCHIATRIC:  No disorder of thought or mood.  ENDOCRINE:  No heat or cold intolerance, polyuria or polydipsia.  HEMATOLOGICAL:  No easy bruising or bleedings:  .     PHYSICAL EXAMINATION:    GENERAL APPEARANCE:  Pt. is not currently dyspneic, in no distress. Pt. is alert, oriented, and pleasant.  HEENT:  Pupils are normal and react normally. No icterus. Mucous membranes well colored.  NECK:  Supple. No lymphadenopathy. Jugular venous pressure not elevated. Carotids equal.   HEART:   The cardiac impulse has a normal quality. Regular. Normal S1 and S2. There are no murmurs, rubs or gallops noted  CHEST:  Chest is clear to auscultation. Normal respiratory effort.  ABDOMEN:  Soft and nontender.   EXTREMITIES:  There is no cyanosis, clubbing or edema.   SKIN:  No rash or significant lesions are noted.    LABS:    05-08    136  |  100  |  17  ----------------------------<  111<H>  3.3<L>   |  30  |  0.66    Ca    8.5      08 May 2019 06:28                    RADIOLOGY & ADDITIONAL STUDIES:

## 2019-05-09 NOTE — DISCHARGE NOTE PROVIDER - CARE PROVIDER_API CALL
Gold Duncan)  Internal Medicine; Pulmonary Disease  [address] [place] [address]  Phone: [phone]  Fax: [phone]  Follow Up Time:

## 2019-05-10 RX ORDER — CARVEDILOL 6.25 MG/1
6.25 TABLET, FILM COATED ORAL
Qty: 90 | Refills: 1 | Status: DISCONTINUED | COMMUNITY
Start: 2019-04-29 | End: 2019-05-10

## 2019-05-10 RX ORDER — FLUTICASONE FUROATE AND VILANTEROL TRIFENATATE 200; 25 UG/1; UG/1
200-25 POWDER RESPIRATORY (INHALATION)
Qty: 360 | Refills: 2 | Status: ACTIVE | COMMUNITY
Start: 2018-01-11

## 2019-05-11 LAB
CULTURE RESULTS: SIGNIFICANT CHANGE UP
CULTURE RESULTS: SIGNIFICANT CHANGE UP
SPECIMEN SOURCE: SIGNIFICANT CHANGE UP
SPECIMEN SOURCE: SIGNIFICANT CHANGE UP

## 2019-05-12 PROBLEM — I21.9 ACUTE MYOCARDIAL INFARCTION, UNSPECIFIED: Chronic | Status: ACTIVE | Noted: 2019-05-07

## 2019-05-12 PROBLEM — J45.909 UNSPECIFIED ASTHMA, UNCOMPLICATED: Chronic | Status: ACTIVE | Noted: 2019-05-07

## 2019-05-12 PROBLEM — J40 BRONCHITIS, NOT SPECIFIED AS ACUTE OR CHRONIC: Chronic | Status: ACTIVE | Noted: 2019-05-07

## 2019-05-13 ENCOUNTER — RESULT CHARGE (OUTPATIENT)
Age: 84
End: 2019-05-13

## 2019-05-14 ENCOUNTER — NON-APPOINTMENT (OUTPATIENT)
Age: 84
End: 2019-05-14

## 2019-05-14 ENCOUNTER — APPOINTMENT (OUTPATIENT)
Dept: INTERNAL MEDICINE | Facility: CLINIC | Age: 84
End: 2019-05-14
Payer: MEDICARE

## 2019-05-14 VITALS
OXYGEN SATURATION: 95 % | HEART RATE: 98 BPM | BODY MASS INDEX: 27.83 KG/M2 | HEIGHT: 64 IN | WEIGHT: 163 LBS | TEMPERATURE: 97.7 F | RESPIRATION RATE: 20 BRPM | SYSTOLIC BLOOD PRESSURE: 130 MMHG | DIASTOLIC BLOOD PRESSURE: 82 MMHG

## 2019-05-14 DIAGNOSIS — K58.0 IRRITABLE BOWEL SYNDROME WITH DIARRHEA: ICD-10-CM

## 2019-05-14 DIAGNOSIS — I10 ESSENTIAL (PRIMARY) HYPERTENSION: ICD-10-CM

## 2019-05-14 DIAGNOSIS — T50.2X5A ADVERSE EFFECT OF CARBONIC-ANHYDRASE INHIBITORS, BENZOTHIADIAZIDES AND OTHER DIURETICS, INITIAL ENCOUNTER: ICD-10-CM

## 2019-05-14 DIAGNOSIS — Z87.898 PERSONAL HISTORY OF OTHER SPECIFIED CONDITIONS: ICD-10-CM

## 2019-05-14 DIAGNOSIS — B34.8 OTHER VIRAL INFECTIONS OF UNSPECIFIED SITE: ICD-10-CM

## 2019-05-14 DIAGNOSIS — Z79.02 LONG TERM (CURRENT) USE OF ANTITHROMBOTICS/ANTIPLATELETS: ICD-10-CM

## 2019-05-14 DIAGNOSIS — J45.901 UNSPECIFIED ASTHMA WITH (ACUTE) EXACERBATION: ICD-10-CM

## 2019-05-14 DIAGNOSIS — R06.02 SHORTNESS OF BREATH: ICD-10-CM

## 2019-05-14 DIAGNOSIS — E78.5 HYPERLIPIDEMIA, UNSPECIFIED: ICD-10-CM

## 2019-05-14 DIAGNOSIS — I25.10 ATHEROSCLEROTIC HEART DISEASE OF NATIVE CORONARY ARTERY WITHOUT ANGINA PECTORIS: ICD-10-CM

## 2019-05-14 DIAGNOSIS — J20.4 ACUTE BRONCHITIS DUE TO PARAINFLUENZA VIRUS: ICD-10-CM

## 2019-05-14 DIAGNOSIS — Z96.653 PRESENCE OF ARTIFICIAL KNEE JOINT, BILATERAL: ICD-10-CM

## 2019-05-14 DIAGNOSIS — Z87.09 PERSONAL HISTORY OF OTHER DISEASES OF THE RESPIRATORY SYSTEM: ICD-10-CM

## 2019-05-14 DIAGNOSIS — Z90.711 ACQUIRED ABSENCE OF UTERUS WITH REMAINING CERVICAL STUMP: ICD-10-CM

## 2019-05-14 DIAGNOSIS — B97.89 OTHER VIRAL AGENTS AS THE CAUSE OF DISEASES CLASSIFIED ELSEWHERE: ICD-10-CM

## 2019-05-14 DIAGNOSIS — Z95.5 PRESENCE OF CORONARY ANGIOPLASTY IMPLANT AND GRAFT: ICD-10-CM

## 2019-05-14 DIAGNOSIS — G89.29 OTHER CHRONIC PAIN: ICD-10-CM

## 2019-05-14 DIAGNOSIS — R05 COUGH: ICD-10-CM

## 2019-05-14 DIAGNOSIS — Z79.82 LONG TERM (CURRENT) USE OF ASPIRIN: ICD-10-CM

## 2019-05-14 DIAGNOSIS — R82.90 UNSPECIFIED ABNORMAL FINDINGS IN URINE: ICD-10-CM

## 2019-05-14 DIAGNOSIS — E87.1 HYPO-OSMOLALITY AND HYPONATREMIA: ICD-10-CM

## 2019-05-14 PROCEDURE — 99496 TRANSJ CARE MGMT HIGH F2F 7D: CPT | Mod: 25

## 2019-05-14 PROCEDURE — 94060 EVALUATION OF WHEEZING: CPT

## 2019-05-14 RX ORDER — ALBUTEROL SULFATE 90 UG/1
108 (90 BASE) INHALANT RESPIRATORY (INHALATION)
Qty: 8 | Refills: 0 | Status: ACTIVE | COMMUNITY
Start: 2019-05-09

## 2019-05-14 NOTE — ASSESSMENT
[FreeTextEntry1] : Mrs. Maynard presents for post hospital followup. She continues to shortness of breath with exertion. This may be related to her previous viral infection with parainfluenza virus and rhinovirus. She does have some decreased breath sounds at the right base. Patient will be sent for a PA and lateral chest x-ray. She will finish tapering prednisone. The patient's finish outpatient antibiotic therapy. It does no seem to improve when she would go to the emergency room for reevaluation.

## 2019-05-14 NOTE — PHYSICAL EXAM
[Well Nourished] : well nourished [No Acute Distress] : no acute distress [Well Developed] : well developed [Well-Appearing] : well-appearing [Normal Sclera/Conjunctiva] : normal sclera/conjunctiva [PERRL] : pupils equal round and reactive to light [EOMI] : extraocular movements intact [Normal Oropharynx] : the oropharynx was normal [No JVD] : no jugular venous distention [Normal Outer Ear/Nose] : the outer ears and nose were normal in appearance [No Lymphadenopathy] : no lymphadenopathy [Supple] : supple [No Respiratory Distress] : no respiratory distress  [Thyroid Normal, No Nodules] : the thyroid was normal and there were no nodules present [No Accessory Muscle Use] : no accessory muscle use [Normal Rate] : normal rate  [Regular Rhythm] : with a regular rhythm [Normal S1, S2] : normal S1 and S2 [No Murmur] : no murmur heard [No Carotid Bruits] : no carotid bruits [No Abdominal Bruit] : a ~M bruit was not heard ~T in the abdomen [No Varicosities] : no varicosities [Pedal Pulses Present] : the pedal pulses are present [No Edema] : there was no peripheral edema [No Extremity Clubbing/Cyanosis] : no extremity clubbing/cyanosis [Soft] : abdomen soft [No Palpable Aorta] : no palpable aorta [Non Tender] : non-tender [No Masses] : no abdominal mass palpated [Non-distended] : non-distended [No HSM] : no HSM [Normal Posterior Cervical Nodes] : no posterior cervical lymphadenopathy [Normal Bowel Sounds] : normal bowel sounds [Normal Anterior Cervical Nodes] : no anterior cervical lymphadenopathy [No CVA Tenderness] : no CVA  tenderness [No Spinal Tenderness] : no spinal tenderness [No Joint Swelling] : no joint swelling [Grossly Normal Strength/Tone] : grossly normal strength/tone [No Rash] : no rash [Normal Gait] : normal gait [Coordination Grossly Intact] : coordination grossly intact [No Focal Deficits] : no focal deficits [Deep Tendon Reflexes (DTR)] : deep tendon reflexes were 2+ and symmetric [Normal Affect] : the affect was normal [Normal Insight/Judgement] : insight and judgment were intact [de-identified] : Decreased breath sounds right base with bilateral rhonchi

## 2019-05-14 NOTE — HISTORY OF PRESENT ILLNESS
[Post-hospitalization from ___ Hospital] : Post-hospitalization from [unfilled] Hospital [Admitted on: ___] : The patient was admitted on [unfilled] [Discharged on ___] : discharged on [unfilled] [Patient Contacted By: ____] : and contacted by [unfilled] [FreeTextEntry2] : Mrs. Maynard presents for a followup evaluation. She was discharged from [facility] on 5/9/19. She presented on 5/6 with cough, shortness of breath and chills. She was found to have parainfluenza virus as well as rhinovirus infection. She was treated with them supplemental oxygen bronchodilators and steroids for acute bronchospasm. She was discharged on tapering prednisone and antibiotics. Mrs. Maynard is complaining of shortness of breath with minimal exertion. She becomes dyspneic after walking approximately 5-10 yards. She has no fevers, chills or hemoptysis. There is no orthopnea paroxysmal nocturnal dyspnea.

## 2019-06-18 ENCOUNTER — MEDICATION RENEWAL (OUTPATIENT)
Age: 84
End: 2019-06-18

## 2019-06-20 ENCOUNTER — RX RENEWAL (OUTPATIENT)
Age: 84
End: 2019-06-20

## 2019-07-03 ENCOUNTER — APPOINTMENT (OUTPATIENT)
Dept: INTERNAL MEDICINE | Facility: CLINIC | Age: 84
End: 2019-07-03

## 2019-07-29 ENCOUNTER — RX RENEWAL (OUTPATIENT)
Age: 84
End: 2019-07-29

## 2019-07-29 ENCOUNTER — MEDICATION RENEWAL (OUTPATIENT)
Age: 84
End: 2019-07-29

## 2019-08-01 ENCOUNTER — CLINICAL ADVICE (OUTPATIENT)
Age: 84
End: 2019-08-01

## 2019-08-01 DIAGNOSIS — Z87.2 PERSONAL HISTORY OF DISEASES OF THE SKIN AND SUBCUTANEOUS TISSUE: ICD-10-CM

## 2019-08-05 ENCOUNTER — APPOINTMENT (OUTPATIENT)
Dept: INTERNAL MEDICINE | Facility: CLINIC | Age: 84
End: 2019-08-05
Payer: MEDICARE

## 2019-08-05 VITALS
RESPIRATION RATE: 18 BRPM | SYSTOLIC BLOOD PRESSURE: 116 MMHG | HEART RATE: 82 BPM | OXYGEN SATURATION: 93 % | WEIGHT: 160 LBS | BODY MASS INDEX: 28.35 KG/M2 | HEIGHT: 63 IN | TEMPERATURE: 98.8 F | DIASTOLIC BLOOD PRESSURE: 74 MMHG

## 2019-08-05 DIAGNOSIS — Z87.898 PERSONAL HISTORY OF OTHER SPECIFIED CONDITIONS: ICD-10-CM

## 2019-08-05 DIAGNOSIS — S72.002A FRACTURE OF UNSPECIFIED PART OF NECK OF LEFT FEMUR, INITIAL ENCOUNTER FOR CLOSED FRACTURE: ICD-10-CM

## 2019-08-05 PROCEDURE — 99213 OFFICE O/P EST LOW 20 MIN: CPT

## 2019-08-05 RX ORDER — LOSARTAN POTASSIUM AND HYDROCHLOROTHIAZIDE 12.5; 5 MG/1; MG/1
50-12.5 TABLET ORAL DAILY
Qty: 90 | Refills: 1 | Status: COMPLETED | COMMUNITY
Start: 2018-12-14 | End: 2019-08-05

## 2019-08-05 RX ORDER — METHYLPREDNISOLONE 4 MG/1
4 TABLET ORAL
Qty: 21 | Refills: 0 | Status: COMPLETED | COMMUNITY
Start: 2019-03-05 | End: 2019-08-05

## 2019-08-05 RX ORDER — CEFUROXIME AXETIL 500 MG/1
500 TABLET ORAL
Refills: 0 | Status: COMPLETED | COMMUNITY
Start: 2019-05-10 | End: 2019-08-05

## 2019-08-05 RX ORDER — DULOXETINE HYDROCHLORIDE 60 MG/1
60 CAPSULE, DELAYED RELEASE PELLETS ORAL
Qty: 90 | Refills: 0 | Status: ACTIVE | COMMUNITY
Start: 2019-08-05

## 2019-08-05 RX ORDER — PREDNISONE 10 MG/1
10 TABLET ORAL
Refills: 0 | Status: COMPLETED | COMMUNITY
Start: 2019-05-10 | End: 2019-08-05

## 2019-08-05 RX ORDER — BENZONATATE 100 MG/1
100 CAPSULE ORAL
Qty: 30 | Refills: 0 | Status: COMPLETED | COMMUNITY
Start: 2019-03-05 | End: 2019-08-05

## 2019-08-05 RX ORDER — AMOXICILLIN AND CLAVULANATE POTASSIUM 875; 125 MG/1; MG/1
875-125 TABLET, COATED ORAL
Qty: 14 | Refills: 0 | Status: COMPLETED | COMMUNITY
Start: 2019-03-05 | End: 2019-08-05

## 2019-08-05 RX ORDER — DOXYCYCLINE HYCLATE 100 MG/1
100 CAPSULE ORAL
Qty: 20 | Refills: 0 | Status: COMPLETED | COMMUNITY
Start: 2019-03-24 | End: 2019-08-05

## 2019-08-05 RX ORDER — PREDNISONE 20 MG/1
20 TABLET ORAL
Qty: 10 | Refills: 0 | Status: COMPLETED | COMMUNITY
Start: 2019-03-24 | End: 2019-08-05

## 2019-08-05 RX ORDER — SPIRONOLACTONE 25 MG/1
25 TABLET ORAL DAILY
Qty: 30 | Refills: 0 | Status: ACTIVE | COMMUNITY
Start: 2019-08-05

## 2019-08-05 NOTE — HISTORY OF PRESENT ILLNESS
[de-identified] : Pt here for followup.She has a history of asthma, HTN, spinal stenosis, CAD, hyperlipidemia.  She fractured her left hip while visiting in [place] requiring a left partial hip replacement there   in 5/2019. She started physical therapy  and just returned to [place] a few days ago. She is requesting  a script to continue physical therapy for  hip abductors, hip extensors and range of motion. She remains  homebound using a walker for assistance with ambulation. \par She denies chest  pain, shortness of breath, cough ,wheeze, fevers, chills.   [FreeTextEntry1] : F/up

## 2019-08-05 NOTE — ASSESSMENT
[FreeTextEntry1] :  s/p left partial hip replacement , s/p fracture \par Visiting nurse referral to assess PT in home , pt is house bound \par Physical  therapy script \par f/up Dr. Duncan as scheduled \par

## 2019-08-05 NOTE — REVIEW OF SYSTEMS
[Joint Pain] : joint pain [Joint Stiffness] : joint stiffness [Back Pain] : back pain [Negative] : Neurological [Fever] : no fever [Chills] : no chills [Fatigue] : no fatigue [FreeTextEntry9] : see HPI

## 2019-08-05 NOTE — PHYSICAL EXAM
[No Acute Distress] : no acute distress [Well Nourished] : well nourished [Normal Oropharynx] : the oropharynx was normal [Well Developed] : well developed [No Lymphadenopathy] : no lymphadenopathy [Normal TMs] : both tympanic membranes were normal [Supple] : supple [No Respiratory Distress] : no respiratory distress  [Regular Rhythm] : with a regular rhythm [Clear to Auscultation] : lungs were clear to auscultation bilaterally [Normal S1, S2] : normal S1 and S2 [Pedal Pulses Present] : the pedal pulses are present [Normal Anterior Cervical Nodes] : no anterior cervical lymphadenopathy [Normal Posterior Cervical Nodes] : no posterior cervical lymphadenopathy [Normal Affect] : the affect was normal [Normal Insight/Judgement] : insight and judgment were intact [Alert and Oriented x3] : oriented to person, place, and time [de-identified] : +1/2 pedal edema bilaterally  [de-identified] : l [de-identified] : uses walker for ambulation

## 2019-08-26 ENCOUNTER — RX RENEWAL (OUTPATIENT)
Age: 84
End: 2019-08-26

## 2019-08-27 ENCOUNTER — RX RENEWAL (OUTPATIENT)
Age: 84
End: 2019-08-27

## 2019-12-06 ENCOUNTER — APPOINTMENT (OUTPATIENT)
Dept: INTERNAL MEDICINE | Facility: CLINIC | Age: 84
End: 2019-12-06
Payer: MEDICARE

## 2019-12-06 VITALS
SYSTOLIC BLOOD PRESSURE: 126 MMHG | OXYGEN SATURATION: 95 % | RESPIRATION RATE: 18 BRPM | HEIGHT: 63 IN | WEIGHT: 165 LBS | HEART RATE: 87 BPM | BODY MASS INDEX: 29.23 KG/M2 | DIASTOLIC BLOOD PRESSURE: 82 MMHG

## 2019-12-06 DIAGNOSIS — J45.909 UNSPECIFIED ASTHMA, UNCOMPLICATED: ICD-10-CM

## 2019-12-06 DIAGNOSIS — J44.9 CHRONIC OBSTRUCTIVE PULMONARY DISEASE, UNSPECIFIED: ICD-10-CM

## 2019-12-06 DIAGNOSIS — F32.9 MAJOR DEPRESSIVE DISORDER, SINGLE EPISODE, UNSPECIFIED: ICD-10-CM

## 2019-12-06 DIAGNOSIS — M19.90 UNSPECIFIED OSTEOARTHRITIS, UNSPECIFIED SITE: ICD-10-CM

## 2019-12-06 DIAGNOSIS — Z74.09 OTHER REDUCED MOBILITY: ICD-10-CM

## 2019-12-06 DIAGNOSIS — R06.09 OTHER FORMS OF DYSPNEA: ICD-10-CM

## 2019-12-06 DIAGNOSIS — M54.16 RADICULOPATHY, LUMBAR REGION: ICD-10-CM

## 2019-12-06 DIAGNOSIS — I10 ESSENTIAL (PRIMARY) HYPERTENSION: ICD-10-CM

## 2019-12-06 DIAGNOSIS — M48.061 SPINAL STENOSIS, LUMBAR REGION WITHOUT NEUROGENIC CLAUDICATION: ICD-10-CM

## 2019-12-06 DIAGNOSIS — E78.5 HYPERLIPIDEMIA, UNSPECIFIED: ICD-10-CM

## 2019-12-06 PROCEDURE — 99214 OFFICE O/P EST MOD 30 MIN: CPT

## 2019-12-06 RX ORDER — CLOTRIMAZOLE AND BETAMETHASONE DIPROPIONATE 10; .5 MG/G; MG/G
1-0.05 CREAM TOPICAL TWICE DAILY
Qty: 1 | Refills: 3 | Status: ACTIVE | COMMUNITY
Start: 2019-12-06 | End: 1900-01-01

## 2019-12-06 RX ORDER — MULTIVIT-MIN/FA/LYCOPEN/LUTEIN .4-300-25
TABLET ORAL
Refills: 0 | Status: ACTIVE | COMMUNITY

## 2019-12-07 PROBLEM — J44.9 CHRONIC OBSTRUCTIVE PULMONARY DISEASE, UNSPECIFIED COPD TYPE: Status: ACTIVE | Noted: 2019-04-09

## 2019-12-07 PROBLEM — J45.909 ASTHMA, EXTRINSIC: Status: ACTIVE | Noted: 2017-06-19

## 2019-12-07 PROBLEM — Z74.09 MOBILITY IMPAIRED: Status: ACTIVE | Noted: 2019-08-05

## 2019-12-07 PROBLEM — R06.09 DYSPNEA ON EXERTION: Status: ACTIVE | Noted: 2019-05-14

## 2019-12-07 NOTE — HISTORY OF PRESENT ILLNESS
[FreeTextEntry1] : Followup [de-identified] : Mrs. Maynard presents for a followup evaluation. She continues to complain of bilateral knee pain. She denies any chest pains or palpitations. Mrs. Maynard does get some shortness of breath with exertion. Patient states she will be moving to [place] within the next several weeks.

## 2019-12-07 NOTE — PHYSICAL EXAM
[No Acute Distress] : no acute distress [Well Nourished] : well nourished [Well Developed] : well developed [Well-Appearing] : well-appearing [Normal Sclera/Conjunctiva] : normal sclera/conjunctiva [PERRL] : pupils equal round and reactive to light [Normal Outer Ear/Nose] : the outer ears and nose were normal in appearance [EOMI] : extraocular movements intact [Normal Oropharynx] : the oropharynx was normal [No JVD] : no jugular venous distention [No Lymphadenopathy] : no lymphadenopathy [Supple] : supple [Thyroid Normal, No Nodules] : the thyroid was normal and there were no nodules present [No Respiratory Distress] : no respiratory distress  [No Accessory Muscle Use] : no accessory muscle use [Clear to Auscultation] : lungs were clear to auscultation bilaterally [Normal Rate] : normal rate  [Regular Rhythm] : with a regular rhythm [Normal S1, S2] : normal S1 and S2 [No Carotid Bruits] : no carotid bruits [No Abdominal Bruit] : a ~M bruit was not heard ~T in the abdomen [Pedal Pulses Present] : the pedal pulses are present [No Varicosities] : no varicosities [No Edema] : there was no peripheral edema [No Palpable Aorta] : no palpable aorta [No Extremity Clubbing/Cyanosis] : no extremity clubbing/cyanosis [Soft] : abdomen soft [Non Tender] : non-tender [Non-distended] : non-distended [No HSM] : no HSM [No Masses] : no abdominal mass palpated [No CVA Tenderness] : no CVA  tenderness [Normal Anterior Cervical Nodes] : no anterior cervical lymphadenopathy [Normal Bowel Sounds] : normal bowel sounds [Normal Posterior Cervical Nodes] : no posterior cervical lymphadenopathy [No Spinal Tenderness] : no spinal tenderness [Grossly Normal Strength/Tone] : grossly normal strength/tone [No Joint Swelling] : no joint swelling [Coordination Grossly Intact] : coordination grossly intact [No Focal Deficits] : no focal deficits [No Rash] : no rash [Normal Gait] : normal gait [Deep Tendon Reflexes (DTR)] : deep tendon reflexes were 2+ and symmetric [Normal Affect] : the affect was normal [Normal Insight/Judgement] : insight and judgment were intact [de-identified] : 2/6 systolic murmur

## 2019-12-07 NOTE — PLAN
[FreeTextEntry1] : Ms. Maynard presents for a followup evaluation. She will continue on current medication regimen which has been reviewed. Comprehensive blood profile was reviewed with the patient. She is planning on moving to [place]. She has already established a relationship with a new primary care physician in [place]. Mrs. Maynard will follow up in this office on an as needed basis.

## 2020-01-06 ENCOUNTER — RX RENEWAL (OUTPATIENT)
Age: 85
End: 2020-01-06

## 2020-02-06 ENCOUNTER — RX RENEWAL (OUTPATIENT)
Age: 85
End: 2020-02-06

## 2020-02-12 NOTE — PATIENT PROFILE ADULT - BRADEN ACTIVITY
LM to [facility], stating that I have faxed over:   Last office note  Demo/ face sheet  Meds/ allegies  And to call back if theres anything else they need for qualification      ,Leilani Moreno MA (3) walks occasionally

## 2020-03-24 NOTE — ED PROVIDER NOTE - NS ED NOTE AC HIGH RISK COUNTRIES
OCCUPATIONAL THERAPY  TREATMENT   Date: 3/24/2020    Patient is a 82 year old female admitted to [facility] for septic shock and obstructive uropathy.  Patient is s/p stent placement on 3/22.  PMH significant for CAD, a-fib and CVA in 2016.  Patient was admitted from [facility] where she reported that she had been completing self-cares with supervision and ambulation with supervision to [place] with use of 2ww.      Visit # since seen by OT:  1  ASSESSMENT:   Patient is displaying fair progress as evidenced by progressing activty tolerance through demonstration of toileting/toilet transfer, functional transfers/mobility, EOB sitting balance/tolerance, functional reach. . Pt seated in bed side chair upon arrival requesting to use BR. Pt completed functional transfers in [facility] with CGA. Pt transferred via [facility] from chair>toilet>EOB. Pt required total assist for toilet hygiene. Pt completed EOB sitting activities. Pt demonstrating posterior lean requiring verbal and tactile cues for correction. Pt completed B UE/LE ROM activities with emphasis on trunk control. Pt completed functional transfers with 2WW and CGAx2 for increased safety. Pt unable to fully march in place with L foot not clearing floor. Pt demonstrated better control once instructed to slow movements down. Pt completed functional mobility around the bed with use of 2WW and CGAx2 for increased safety d/t unsteadiness and abnormal gait pattern.      Performance deficits limiting patient's ability to safely and independently complete ADLs and functional mobility include: decreased strength, balance deficits, diminished safety awareness, decreased activity tolerance, postural problems, decreased endurance.  Further skilled occupational therapy is required to address these limitation in order to maximize the patient's independence.      After today's session this therapist is recommending the following location for optimal discharge:  Recommendations for Discharge:  Sub-acute nursing home(return to [facility])  OT Identified Barriers to Discharge: deconditioning, balance deficits, functional decline      Equipment for discharge: to be determined - continuing to assess needs at this time     Focus of today's therapy session:   Toileting/toilet transfer, functional transfers/mobility, EOB sitting balance    See below for further details.    Treatment Plan for Next Session:  Co-tx for transfer/ambulation progression and safety, simple grooming tasks, UE strengthening, functional endurance    Therapy Precautions:  Falls Risk  Activity: As tolerated and up with assist  Davis Fall Scale Score: 70  Alarms:  Bed alarm activated at end of session  Basic Lines:  Capped IV  Collaboration with: PT and RN    SUBJECTIVE:   Pt. reported agreeable to therapy  Pt's personal goal for therapy: return to [facility]    Pain:   Patient Currently in Pain: No     ADLs:   Toileting:  Total Assistance  Reason for assistance:  weakness, fatigue, unsteadiness   Adaptive Equipment:  Carlita Stedy       MOBILITY/EXERCISE:    Bed:   Sit to Supine:  Moderate Assistance (Mod)  Reason for Assistance:  requires increased time to complete, verbal cues for sequencing/hand placement, fatigue    Transfers:   Sit to Stand:  Touching/Steadying Assistance, Minimal Assistance (Min), of 2  Stand to Sit:  Touching/Steadying Assistance, Minimal Assistance (Min), of 2  Stand pivot:  Touching/Steadying Assistance, Minimal Assistance (Min), of 2  Toilet:  Minimal Assistance (Min)  Device Used:  gait belt, 2 wheeled walker and Carlita Stedy  Reason for Assistance:  requires increased time to complete, safety due to patient's increased impulsivity with transfer tasks, weakness, verbal cues for hand placement/sequencing of task, manual cues for walker management, fatigue, unsteadiness , Patient demonstrating posterior lean and required increased verbal cues/manual cues for weight shifting    ADL Functional Mobility/Ambulation:  Patient completed  functional ambulation in room with use of 2WW and CGA/min Ax2 for increased safety.    Balance:  Static sitting: Supervision (Supv)  Dynamic sitting: Touching/Steadying Assistance  Static standing:  Touching/Steadying Assistance, Minimal Assistance (Min)  Dynamic standing: Minimal Assistance (Min), of 2  With use of 2ww    Exercises:  Not addressed this session      OBSERVATION:   Edema: none    Vital Signs: Vitals stable throughout therapy session.    Cognition:  Alert and Oriented x4    Activity Tolerance: limited by weakness/fatigue     EDUCATION:    Learner: patient  Teaching Content: Bed Mobility, Transfers, Safety Awareness, Therapy Plan of Care, Dressing Techniques and Home Safety  Please reference the patient education activity for further information regarding the patient's learning assessment.     GOALS:     Review Date: 3/30/2020  1. Patient will complete upper body dressing with Supervision (Supv).  2. Patient will complete lower body dressing with Moderate Assistance (Mod).  3. Patient will complete toileting with Minimal Assistance (Min).  4. Patient will complete toilet transfer with Minimal Assistance (Min).  5. Patient will complete hygiene/grooming with Supervision (Supv)..    Rehab potential is good  due to the following positive factors motivation level, recent onset; and is impacted by the following negative factors age, activity tolerance, co-morbidities       PLAN OF CARE:    OT Frequency: 5 days/week  Duration: LOS  Treatment Interventions: ADL retraining, Functional transfer training, UE strengthening/ROM, Endurance training, Patient/Family training, Equipment eval/education, Compensatory technique education    The plan of care and goals were established with the patient and she is in agreement.      OT Time Spent: 21 minutes (03/24/20 6095)     No

## 2020-07-06 ENCOUNTER — RX RENEWAL (OUTPATIENT)
Age: 85
End: 2020-07-06

## 2020-07-30 ENCOUNTER — RX RENEWAL (OUTPATIENT)
Age: 85
End: 2020-07-30

## 2022-08-03 NOTE — DISCHARGE NOTE NURSING/CASE MANAGEMENT/SOCIAL WORK - NSDCDPATPORTLINK_GEN_ALL_CORE
Pounds Preamble Statement (Weight Entered In Details Tab): Reported Weight in pounds: You can access the [facility], offered by [facility], by registering with the following website: [email or web address] Add High Risk Medication Management Associated Diagnosis?: Yes Xerosis Normal Treatment: I recommended application of Cetaphil or CeraVe numerous times a day going to bed to all dry areas. Detail Level: Zone Nosebleeds Normal Treatment: I explained this is common when taking isotretinoin. I recommended saline mist in each nostril multiple times a day. If this worsens they will contact us. Cheilitis Normal Treatment: I recommended application of Vaseline or Aquaphor numerous times a day (as often as every hour) and before going to bed. Weight Units: pounds Next Month's Dosage: Continue Current Dosage Hypercholesterolemia Monitoring: I explained this is common when taking isotretinoin. We will monitor closely. Any Nosebleeds?: Yes - Normal Treatment Retinoid Dermatitis Normal Treatment: I recommended more frequent application of Cetaphil or CeraVe to the areas of dermatitis. Completed Therapy?: No Xerosis Normal Treatment: I recommended application of Cetaphil or CeraVe numerous times a day and before going to bed to all dry areas. Use Therapeutic Ranged Or Therapeutic Target: please select Range or Target Cheilitis Aggressive Treatment: I recommended application of Vaseline or Aquaphor numerous times a day (as often as every hour) and before going to bed. I also prescribed a topical steroid for twice daily use. Kilograms Preamble Statement (Weight Entered In Details Tab): Reported Weight in kilograms: Male Completion Statement: After discussing his treatment course we decided to discontinue isotretinoin therapy at this time. He shouldn't donate blood for one month after the last dose. He should call with any new symptoms of depression. Hypertriglyceridemia Treatment: I explained this is common when taking isotretinoin. If this worsens they will contact us. They may try OTC ibuprofen. Xerosis Aggressive Treatment: I recommended application of Cetaphil or CeraVe numerous times a day and before going to bed to all dry areas. I also prescribed a topical steroid for twice daily use. Retinoid Dermatitis Aggressive Treatment: I recommended more frequent application of Cetaphil or CeraVe to the areas of dermatitis. I also prescribed a topical steroid for twice daily use until the dermatitis resolves. Hypertriglyceridemia Monitoring: I explained this is common when taking isotretinoin. If this worsens they will contact us. Female Pregnancy Counseling Text: Female patients should also be on two forms of birth control while taking this medication and for one month after their last dose. Target Cumulative Dosage (In Mg/Kg): 135 Headache Monitoring: I recommended monitoring the headaches for now. There is no evidence of increased intracranial pressure. They were instructed to call if the headaches are worsening. Counseling Text: I reviewed the side effect in detail. Patient should get monthly blood tests, not donate blood, not drive at night if vision affected, and not share medication. Xerosis Aggressive Treatment: I recommended application of Cetaphil or CeraVe numerous times a day going to bed to all dry areas. I also prescribed a topical steroid for twice daily use. Months Of Therapy Completed: 2 Female Completion Statement: After discussing her treatment course we decided to discontinue isotretinoin therapy at this time. I explained that she would need to continue her birth control methods for at least one month after the last dosage. She should also get a pregnancy test one month after the last dose. She shouldn't donate blood for one month after the last dose. She should call with any new symptoms of depression. Upper Range (In Mg/Kg): 150 Dosing Month 1 (Required For Cumulative Dosing): 20mg Daily What Is The Patient's Gender: Male Dosing Month 2 (Required For Cumulative Dosing): 20mg BID Lower Range (In Mg/Kg): 120

## 2024-12-03 ENCOUNTER — TRANSCRIPTION ENCOUNTER (OUTPATIENT)
Age: 88
End: 2024-12-03

## 2024-12-04 ENCOUNTER — TRANSCRIPTION ENCOUNTER (OUTPATIENT)
Age: 88
End: 2024-12-04

## 2024-12-06 ENCOUNTER — TRANSCRIPTION ENCOUNTER (OUTPATIENT)
Age: 88
End: 2024-12-06

## 2024-12-12 PROBLEM — I50.9 OTHER CONGESTIVE HEART FAILURE: Status: ACTIVE | Noted: 2024-12-12

## 2024-12-24 NOTE — ED PROVIDER NOTE - DISPOSITION TYPE
Problem: Discharge Planning  Goal: Discharge to home or other facility with appropriate resources  12/24/2024 0326 by Madyson San RN  Outcome: Progressing  Flowsheets (Taken 12/23/2024 2109)  Discharge to home or other facility with appropriate resources: Identify barriers to discharge with patient and caregiver  12/23/2024 1534 by Cherise Rahman RN  Outcome: Progressing     Problem: ABCDS Injury Assessment  Goal: Absence of physical injury  12/24/2024 0326 by Madyson San RN  Outcome: Progressing  Flowsheets (Taken 12/23/2024 2109)  Absence of Physical Injury: Implement safety measures based on patient assessment  12/23/2024 1534 by Cherise Rahman RN  Outcome: Progressing     Problem: Safety - Adult  Goal: Free from fall injury  12/24/2024 0326 by Madyson San RN  Outcome: Progressing  Flowsheets (Taken 12/23/2024 2109)  Free From Fall Injury: Instruct family/caregiver on patient safety  12/23/2024 1534 by Cherise Rahman RN  Outcome: Progressing     Problem: Confusion  Goal: Confusion, delirium, dementia, or psychosis is improved or at baseline  Description: INTERVENTIONS:  1. Assess for possible contributors to thought disturbance, including medications, impaired vision or hearing, underlying metabolic abnormalities, dehydration, psychiatric diagnoses, and notify attending LIP  2. [place] high risk fall precautions, as indicated  3. Provide frequent short contacts to provide reality reorientation, refocusing and direction  4. Decrease environmental stimuli, including noise as appropriate  5. Monitor and intervene to maintain adequate nutrition, hydration, elimination, sleep and activity  6. If unable to ensure safety without constant attention obtain sitter and review sitter guidelines with assigned personnel  7. Initiate Psychosocial CNS and Spiritual Care consult, as indicated  12/24/2024 0326 by Madyson San RN  Outcome: Progressing  Flowsheets (Taken 12/23/2024 2109)  Effect of  12/23/2024 2109)  Minimal or absence of nausea and vomiting: Administer IV fluids as ordered to ensure adequate hydration  12/23/2024 1534 by Cherise Rahman RN  Outcome: Progressing  Flowsheets (Taken 12/23/2024 0924)  Minimal or absence of nausea and vomiting: Administer IV fluids as ordered to ensure adequate hydration  Goal: Maintains or returns to baseline bowel function  12/24/2024 0326 by Madyson San RN  Outcome: Progressing  Flowsheets (Taken 12/23/2024 2109)  Maintains or returns to baseline bowel function: Assess bowel function  12/23/2024 1534 by Cherise Rahman RN  Outcome: Progressing  Flowsheets (Taken 12/23/2024 0924)  Maintains or returns to baseline bowel function: Assess bowel function  Goal: Maintains adequate nutritional intake  12/24/2024 0326 by Madyson San RN  Outcome: Progressing  Flowsheets (Taken 12/23/2024 2109)  Maintains adequate nutritional intake: Monitor percentage of each meal consumed  12/23/2024 1534 by Cherise Rahman RN  Outcome: Progressing  Flowsheets (Taken 12/23/2024 0924)  Maintains adequate nutritional intake: Monitor percentage of each meal consumed  Goal: Establish and maintain optimal ostomy function  12/24/2024 0326 by Madyson San RN  Outcome: Progressing  Flowsheets (Taken 12/23/2024 2109)  Establish and maintain optimal ostomy function: Monitor output from ostomies  12/23/2024 1534 by Cherise Rahman RN  Outcome: Progressing  Flowsheets (Taken 12/23/2024 0924)  Establish and maintain optimal ostomy function: Monitor output from ostomies     Problem: Genitourinary - Adult  Goal: Absence of urinary retention  12/24/2024 0326 by Madyson San RN  Outcome: Progressing  Flowsheets (Taken 12/23/2024 2109)  Absence of urinary retention: Assess patient’s ability to void and empty bladder  12/23/2024 1534 by Cherise Rahman RN  Outcome: Progressing  Flowsheets (Taken 12/23/2024 0924)  Absence of urinary retention: Assess patient’s ability to void  ADMIT

## 2025-02-10 NOTE — H&P ADULT - NSHPSOURCEINFORD_GEN_ALL_CORE
Chart(s)/Patient PATIENT AWARE RX WAS SENT.   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